# Patient Record
Sex: FEMALE | Race: WHITE | Employment: FULL TIME | URBAN - METROPOLITAN AREA
[De-identification: names, ages, dates, MRNs, and addresses within clinical notes are randomized per-mention and may not be internally consistent; named-entity substitution may affect disease eponyms.]

---

## 2017-05-11 ENCOUNTER — OFFICE VISIT (OUTPATIENT)
Dept: FAMILY MEDICINE CLINIC | Age: 52
End: 2017-05-11

## 2017-05-11 VITALS
HEIGHT: 66 IN | WEIGHT: 212 LBS | BODY MASS INDEX: 34.07 KG/M2 | TEMPERATURE: 97.9 F | RESPIRATION RATE: 16 BRPM | SYSTOLIC BLOOD PRESSURE: 128 MMHG | OXYGEN SATURATION: 98 % | HEART RATE: 84 BPM | DIASTOLIC BLOOD PRESSURE: 82 MMHG

## 2017-05-11 DIAGNOSIS — I10 ESSENTIAL HYPERTENSION, BENIGN: ICD-10-CM

## 2017-05-11 DIAGNOSIS — M79.89 SWELLING OF UPPER ARM: Primary | ICD-10-CM

## 2017-05-11 DIAGNOSIS — R03.0 ELEVATED BP WITHOUT DIAGNOSIS OF HYPERTENSION: ICD-10-CM

## 2017-05-11 DIAGNOSIS — K50.90 CROHN'S DISEASE WITHOUT COMPLICATION, UNSPECIFIED GASTROINTESTINAL TRACT LOCATION (HCC): ICD-10-CM

## 2017-05-11 RX ORDER — AMLODIPINE BESYLATE 5 MG/1
5 TABLET ORAL DAILY
Qty: 90 TAB | Refills: 0 | Status: SHIPPED | OUTPATIENT
Start: 2017-05-11 | End: 2017-10-09 | Stop reason: SDUPTHER

## 2017-05-11 RX ORDER — AMLODIPINE BESYLATE 5 MG/1
5 TABLET ORAL DAILY
Qty: 90 TAB | Refills: 1 | Status: CANCELLED | OUTPATIENT
Start: 2017-05-11

## 2017-05-11 RX ORDER — CEPHALEXIN 500 MG/1
500 CAPSULE ORAL 2 TIMES DAILY
Qty: 14 CAP | Refills: 0 | Status: SHIPPED | OUTPATIENT
Start: 2017-05-11 | End: 2017-05-18

## 2017-05-11 RX ORDER — TRIAMCINOLONE ACETONIDE 5 MG/G
CREAM TOPICAL 2 TIMES DAILY
Qty: 15 G | Refills: 0 | Status: SHIPPED | OUTPATIENT
Start: 2017-05-11

## 2017-05-11 NOTE — MR AVS SNAPSHOT
Visit Information Date & Time Provider Department Dept. Phone Encounter #  
 5/11/2017  1:00 PM Minor Landis, [address] [id number] Follow-up Instructions Return in about 3 weeks (around 6/1/2017), or w/ dr. Pierre Rehman. Upcoming Health Maintenance Date Due  
 PAP AKA CERVICAL CYTOLOGY 6/3/2017 INFLUENZA AGE 9 TO ADULT 8/1/2017 DTaP/Tdap/Td series (2 - Td) 6/3/2024 Allergies as of 5/11/2017  Review Complete On: 5/11/2017 By: Minor Landis MD  
  
 Severity Noted Reaction Type Reactions Lisinopril  04/15/2015    Cough Current Immunizations  Never Reviewed Name Date Influenza Vaccine (Quad) PF 9/30/2016, 10/28/2015 Tdap 6/3/2014 Not reviewed this visit You Were Diagnosed With   
  
 Codes Comments Swelling of upper arm    -  Primary ICD-10-CM: M79.89 ICD-9-CM: 729.81 Crohn's disease without complication, unspecified gastrointestinal tract location [facility])     ICD-10-CM: K50.90 ICD-9-CM: 555.9 Essential hypertension, benign     ICD-10-CM: I10 
ICD-9-CM: 401.1 Vitals BP Pulse Temp Resp Height(growth percentile) Weight(growth percentile) 128/82 (BP 1 Location: Left arm, BP Patient Position: Sitting) 84 97.9 °F (36.6 °C) (Oral) 16 5' 6\" (1.676 m) 212 lb (96.2 kg) SpO2 BMI OB Status Smoking Status 98% 34.22 kg/m2 Having regular periods Never Smoker Vitals History BMI and BSA Data Body Mass Index Body Surface Area  
 34.22 kg/m 2 2.12 m 2 Preferred Pharmacy Pharmacy Name Phone [facility] [address] [place] [address] [phone] Your Updated Medication List  
  
   
This list is accurate as of: 5/11/17  1:37 PM.  Always use your most recent med list. amLODIPine 5 mg tablet Commonly known as:  Janine Pretty Take 1 Tab by mouth daily. cephALEXin 500 mg capsule Commonly known as:  Yuliana Torres Take 1 Cap by mouth two (2) times a day for 7 days. cholecalciferol 1,000 unit Cap Commonly known as:  VITAMIN D3 Take 2,000 Units by mouth daily. iron, carbonyl 45 mg Tab Commonly known as:  Diya Patron Take 1 Tab by mouth daily. multivitamin tablet Commonly known as:  ONE A DAY Take 1 Tab by mouth daily. Nature Made Multi for Her 50+ PROBIOTIC PEARLS PO Take 1 Cap by mouth daily. REMICADE IV  
by IntraVENous route every six (6) weeks. triamcinolone 0.5 % topical cream  
Commonly known as:  ARISTOCORT Apply  to affected area two (2) times a day. use thin layer TYLENOL EXTRA STRENGTH 500 mg tablet Generic drug:  acetaminophen Take  by mouth every six (6) hours as needed for Pain. TYLENOL SINUS CONGESTION PAIN 2-5-325 mg Tab Generic drug:  cpm-phenyleph-acetaminophen Take  by mouth. Prescriptions Sent to Pharmacy Refills  
 amLODIPine (NORVASC) 5 mg tablet 0 Sig: Take 1 Tab by mouth daily. Class: Normal  
 Pharmacy: [address] Ph #: [phone] Route: Oral  
 cephALEXin (KEFLEX) 500 mg capsule 0 Sig: Take 1 Cap by mouth two (2) times a day for 7 days. Class: Normal  
 Pharmacy: [address] Ph #: [phone] Route: Oral  
 triamcinolone (ARISTOCORT) 0.5 % topical cream 0 Sig: Apply  to affected area two (2) times a day. use thin layer Class: Normal  
 Pharmacy: [address] Ph #: [phone] Route: Topical  
  
We Performed the Following [address] [facility] [id number] CPT(R)] Follow-up Instructions Return in about 3 weeks (around 6/1/2017), or w/ dr. Jadyn Mcmullen. Introducing [facility]! Dear Linda Coates: Thank you for requesting a MyChart account.   Our records indicate that you already have an active [employer] account. You can access your account anytime at [email or web address] Did you know that you can access your hospital and ER discharge instructions at any time in [employer]? You can also review all of your test results from your hospital stay or ER visit. Additional Information If you have questions, please visit the Frequently Asked Questions section of the [employer] website at [email or web address] Remember, [employer] is NOT to be used for urgent needs. For medical emergencies, dial 911. Now available from your iPhone and Android! Please provide this summary of care documentation to your next provider. Your primary care clinician is listed as Enedina Michelle. If you have any questions after today's visit, please call [phone].

## 2017-05-11 NOTE — PROGRESS NOTES
Chief Complaint   Patient presents with    Rash     on upper forearm ithcing     1. Have you been to the ER, urgent care clinic since your last visit? Hospitalized since your last visit? No    2. Have you seen or consulted any other health care providers outside of the [address] [place] [address] since your last visit? Include any pap smears or colon screening.  No

## 2017-05-11 NOTE — PROGRESS NOTES
Shruti Turner, 54 y.o.,  female    SUBJECTIVE  R upper arm rash/swelling x 3 days (Dr. Ced Ricardo pt)    Pt reports R medial arm deep soreness, bruising, itching, tenderness. Says occurred few days after remicaide infusion on R brachial area for her crohns. No trauma, new topical allergens she is aware of, discomfort is tolerable. No previous h/o similar problem with previous infusion. Also requesting norvasc refill for HTN    ROS:  See HPI, all others negative        Patient Active Problem List   Diagnosis Code    Atrial fibrillation ([facility]) I48.91    PSVT (paroxysmal supraventricular tachycardia) ([facility]) I47.1    Chest pain, unspecified R07.9    Crohn's disease ([facility] [address] K50.90    Essential hypertension, benign I10    S/P ablation operation for arrhythmia Z98.890, Z86.79    Anal fistula K60.3    Rectovaginal fistula N82.3    Hypertension I10    S/P colonoscopy/ repeat in 5 years around 2020. Z98.890    Prediabetes R73.03    Sleep apnea/ on c-pap following neurology  G47.30    Vitamin D deficiency E55.9       Current Outpatient Prescriptions   Medication Sig Dispense Refill    amLODIPine (NORVASC) 5 mg tablet Take 1 Tab by mouth daily. 90 Tab 0    cephALEXin (KEFLEX) 500 mg capsule Take 1 Cap by mouth two (2) times a day for 7 days. 14 Cap 0    triamcinolone (ARISTOCORT) 0.5 % topical cream Apply  to affected area two (2) times a day. use thin layer 15 g 0    multivitamin (ONE A DAY) tablet Take 1 Tab by mouth daily. Nature Made Multi for Her 50+      L. ACIDOPHILUS/BIFIDO LONGUM (PROBIOTIC PEARLS PO) Take 1 Cap by mouth daily.  cholecalciferol (VITAMIN D3) 1,000 unit cap Take 2,000 Units by mouth daily.  iron, carbonyl (FEOSOL) 45 mg tab Take 1 Tab by mouth daily.  acetaminophen (TYLENOL EXTRA STRENGTH) 500 mg tablet Take  by mouth every six (6) hours as needed for Pain.  INFLIXIMAB (REMICADE IV) by IntraVENous route every six (6) weeks.       cpm-phenyleph-acetaminophen (TYLENOL SINUS CONGESTION PAIN) 2-5-325 mg tab Take  by mouth. Allergies   Allergen Reactions    Lisinopril Cough       Past Medical History:   Diagnosis Date    Anal fistula     Anemia     Crohn's disease ([address]     H/O prior ablation treatment     H/O tubal ligation     History of cholecystectomy     History of echocardiogram 12/19/2011    EF 60-65%. No significant valvular heart disease.  Hypertension     Normal nuclear stress test 12/19/2011    No ischemia or prior infarction. EF 62%. Equivocal EKG on max EST. Ex time 6:31.  PSVT (paroxysmal supraventricular tachycardia) ([facility])        Social History     Social History    Marital status:      Spouse name: N/A    Number of children: N/A    Years of education: N/A     Occupational History    Not on file. Social History Main Topics    Smoking status: Never Smoker    Smokeless tobacco: Never Used    Alcohol use Yes      Comment: rarely    Drug use: No    Sexual activity: Not Currently     Other Topics Concern    Not on file     Social History Narrative       Family History   Problem Relation Age of Onset    Breast Cancer Mother     Cancer Mother      breast    Hypertension Father     Pacemaker Father 80    Depression Daughter     Alcohol abuse Sister     Drug Abuse Sister          OBJECTIVE    Physical Exam:     Visit Vitals    /82 (BP 1 Location: Left arm, BP Patient Position: Sitting)    Pulse 84    Temp 97.9 °F (36.6 °C) (Oral)    Resp 16    Ht 5' 6\" (1.676 m)    Wt 212 lb (96.2 kg)    SpO2 98%    BMI 34.22 kg/m2       General: alert, well-appearing, in no apparent distress or pain  CVS: normal rate, regular rhythm, distinct S1 and S2  Lungs:clear to ausculation bilaterally, no crackles, wheezing or rhonchi noted  Abdomen: normoactive bowel sounds, soft, non-tender  Extremities: mild bruising, tender knot on R upper medial arm, mild scaling and warmth as well.    Skin: warm, no lesions, rashes noted  Psych: mood and affect normal        ASSESSMENT/PLAN  Anastacia Diego was seen today for rash. Diagnoses and all orders for this visit:    Swelling of upper arm  Consider superficial thrombophlebitis, pt cannot be on NSAIDs/ASA due to crohns  Check:  -     DUPLEX EXTREM VENOUS,UNI OR LTD  Will give topical steroids and keflex to cover for dermatitis/cellulitis as well. -     cephALEXin (KEFLEX) 500 mg capsule; Take 1 Cap by mouth two (2) times a day for 7 days. -     triamcinolone (ARISTOCORT) 0.5 % topical cream; Apply  to affected area two (2) times a day. use thin layer  Crohn's disease without complication, unspecified gastrointestinal tract location [facility])    Essential hypertension, benign  -     amLODIPine (NORVASC) 5 mg tablet; Take 1 Tab by mouth daily. Ff-up in with dr. Richard Marques in 3 weeks. Or sooner prn. Patient understands plan of care. Patient has provided input and agrees with goals.

## 2017-05-25 ENCOUNTER — HOSPITAL ENCOUNTER (OUTPATIENT)
Dept: VASCULAR SURGERY | Age: 52
Discharge: HOME | End: 2017-05-25
Attending: FAMILY MEDICINE
Payer: COMMERCIAL

## 2017-05-25 DIAGNOSIS — M79.89 SWELLING OF UPPER ARM: ICD-10-CM

## 2017-05-25 PROCEDURE — 93971 EXTREMITY STUDY: CPT

## 2017-05-25 NOTE — PROCEDURES
[facility]  *** FINAL REPORT ***    Name: Manny Holguin  MRN: [id number]    Outpatient  : 01 Sep 1965  HIS Order #: [id number]  [address] [facility] Visit #: [id number]  Date: 25 May 2017    TYPE OF TEST: Peripheral Venous Testing    REASON FOR TEST  Limb swelling    Right Arm:-  Deep venous thrombosis:           No  Superficial venous thrombosis:    No      INTERPRETATION/FINDINGS  Duplex images were obtained using 2-D gray scale, color flow, and  spectral Doppler analysis. Right arm :  1. Deep vein(s) visualized include the internal jugular, subclavian,  axillary and brachial veins. 2. No evidence of deep venous thrombosis detected in the veins  visualized. 3. No evidence of deep vein thrombosis in the contralateral internal  jugular and subclavian veins. 4. No evidence of deep vein thrombosis in the contralateral internal  jugular vein. 5. No evidence of deep vein thrombosis in the contralateral subclavian   vein. 6. Superficial vein(s) visualized include the basilic (upper arm),  basilic (forearm), cephalic (upper arm) and cephalic (forearm) veins. 7. No evidence of superficial thrombosis detected. ADDITIONAL COMMENTS    I have personally reviewed the data relevant to the interpretation of  this  study. TECHNOLOGIST: Sajan Umaña, [facility], RVT/  Signed: 2017 11:06 AM    PHYSICIAN: Holland Tom D.O.   Signed: 2017 03:35 PM

## 2017-05-30 NOTE — PROGRESS NOTES
No evidence of clot on US, this is reassuring, how is the arm swelling? If not improved pls have her ff-up with dr. Kelli Meza.

## 2017-06-01 NOTE — PROGRESS NOTES
Patient identified with 2 identifiers (name and ). Patient aware of US results showing no clot. Patient states arm is much better.

## 2017-10-10 RX ORDER — AMLODIPINE BESYLATE 5 MG/1
TABLET ORAL
Qty: 30 TAB | Refills: 0 | Status: SHIPPED | OUTPATIENT
Start: 2017-10-10

## 2017-10-10 RX ORDER — AMLODIPINE BESYLATE 5 MG/1
TABLET ORAL
Qty: 90 TAB | Refills: 0 | Status: SHIPPED | OUTPATIENT
Start: 2017-10-10 | End: 2017-10-10 | Stop reason: SDUPTHER

## 2017-11-10 ENCOUNTER — HOSPITAL ENCOUNTER (OUTPATIENT)
Dept: GENERAL RADIOLOGY | Age: 52
Discharge: HOME | End: 2017-11-10
Attending: INTERNAL MEDICINE
Payer: COMMERCIAL

## 2017-11-10 DIAGNOSIS — R19.5 CHANGE IN STOOL CALIBER: ICD-10-CM

## 2017-11-10 PROCEDURE — 74270 X-RAY XM COLON 1CNTRST STD: CPT

## 2017-11-10 PROCEDURE — 74011000255 HC RX REV CODE- 255: Performed by: INTERNAL MEDICINE

## 2017-11-10 RX ADMIN — BARIUM SULFATE: 0.6 CREAM ORAL at 08:00

## 2017-11-10 RX ADMIN — BARIUM SULFATE 176 G: 960 POWDER, FOR SUSPENSION ORAL at 08:00

## 2017-11-16 ENCOUNTER — OFFICE VISIT (OUTPATIENT)
Dept: SURGERY | Age: 52
End: 2017-11-16

## 2017-11-16 VITALS
BODY MASS INDEX: 34.87 KG/M2 | HEIGHT: 66 IN | WEIGHT: 217 LBS | DIASTOLIC BLOOD PRESSURE: 84 MMHG | SYSTOLIC BLOOD PRESSURE: 128 MMHG | RESPIRATION RATE: 16 BRPM | TEMPERATURE: 98.1 F | OXYGEN SATURATION: 95 % | HEART RATE: 76 BPM

## 2017-11-16 DIAGNOSIS — N81.6 RECTOCELE: Primary | ICD-10-CM

## 2017-11-16 NOTE — LETTER
11/16/2017 11:44 AM 
 
Patient:  Marci Dickerson YOB: 1965 Date of Visit: 11/16/2017 Raleigh Severance, MD 
[address] [place] [address] [address] [facility] [address] VIA Facsimile: [phone] Manohar Hu MD 
[address] [facility] [address] [facility] [place] [address] VIA In Basket Dear Sadi Coco saw Miguel Al in the office today for her issues with constipation. On defecography she has a very large anterior rectocele. She has an underlying history of Crohn's disease. She tells me she had pancolonic disease prior to the initiation of Remicade as well as anorectal disease with fistula and a fissure. On exam she has a relatively stenotic anal canal with a palpable moderate size anterior rectocele. Given her anorectal issues and history of Crohn's I think the preferred approach for repair would be transvaginally. I tell her I perform this procedure transrectally with excellent results but that she would be far better off avoiding this approach given her disease. We will refer her to Dr. Augusto Gr for consultation regarding repair. Thank you very much for your referral of Ms. Reno Fernando. If you have questions, please do not hesitate to call me. I look forward to following Ms. Payne along with you and will keep you updated as to her progress. Sincerely, Henrry Cho MD

## 2017-11-16 NOTE — PROGRESS NOTES
HPI: David Lee is a 50 y.o. female presenting with chief complain of rectocele. The patient has had long-standing issues with constipation. This is gotten worse over the last year. She has tried Metamucil. She cannot tolerate MiraLAX. She says she has bowel movements approximately every 4-5 days unless she has enemas for help. She sees occasional blood on the toilet paper when wiping. She has some occasional bloating. She has a history of Crohn's disease. She states this was pan colonic. There were also areas of the small bowel that were seen as inflamed on capsule study. She has been on Remicade for approximately 5 years with excellent effect. Recent flexible sigmoidoscopy to the splenic flexure did not show any active disease. She denies fecal soilage or incontinence. She had a colonoscopy 2 years ago which was normal.  She had recent defecography which showed a large rectocele, though it did eventually empty. She has a history of perianal abscess and fistula. She is status post seton placement. Currently the fistulas are quiescent on the Remicade. Past Medical History:   Diagnosis Date    Anal fistula     Anemia     Crohn's disease ([address]     H/O prior ablation treatment     H/O tubal ligation     History of cholecystectomy     History of echocardiogram 12/19/2011    EF 60-65%. No significant valvular heart disease.  Hypertension     Normal nuclear stress test 12/19/2011    No ischemia or prior infarction. EF 62%. Equivocal EKG on max EST. Ex time 6:31.  PSVT (paroxysmal supraventricular tachycardia) ([facility])        Past Surgical History:   Procedure Laterality Date    CARDIAC SURG PROCEDURE UNLIST      cardiac ablation for svt    HX ADENOIDECTOMY      HX CHOLECYSTECTOMY      HX GI  5/5/2011    Incision & Drainage of perirectal abscess w/ placement of seton    HX GI  2/17/2011    Incision & Drainage of perirectal abscess w/ placement of seton.     HX TUBAL LIGATION Family History   Problem Relation Age of Onset    Breast Cancer Mother     Cancer Mother      breast    Hypertension Father     Pacemaker Father 80    Depression Daughter     Alcohol abuse Sister     Drug Abuse Sister        Social History     Social History    Marital status:      Spouse name: N/A    Number of children: N/A    Years of education: N/A     Social History Main Topics    Smoking status: Never Smoker    Smokeless tobacco: Never Used    Alcohol use Yes      Comment: rarely    Drug use: No    Sexual activity: Not Currently     Other Topics Concern    None     Social History Narrative       Review of Systems - Review of Systems   Constitutional: Negative. HENT: Negative. Eyes: Negative. Respiratory: Negative. Cardiovascular: Negative. Gastrointestinal: Positive for blood in stool and constipation. Negative for abdominal pain, diarrhea, heartburn, melena, nausea and vomiting. Genitourinary: Negative. Musculoskeletal: Negative. Skin: Negative. Neurological: Negative. Endo/Heme/Allergies: Negative. Psychiatric/Behavioral: Negative for depression, hallucinations, memory loss, substance abuse and suicidal ideas. The patient is nervous/anxious. The patient does not have insomnia. Outpatient Prescriptions Marked as Taking for the 11/16/17 encounter (Office Visit) with Darya Franklin MD   Medication Sig Dispense Refill    amLODIPine (NORVASC) 5 mg tablet TAKE 1 TABLET BY MOUTH DAILY. 30 Tab 0    multivitamin (ONE A DAY) tablet Take 1 Tab by mouth daily. Nature Made Multi for Her 50+      L. ACIDOPHILUS/BIFIDO LONGUM (PROBIOTIC PEARLS PO) Take 1 Cap by mouth daily.  cholecalciferol (VITAMIN D3) 1,000 unit cap Take 2,000 Units by mouth daily.  iron, carbonyl (FEOSOL) 45 mg tab Take 1 Tab by mouth daily.  cpm-phenyleph-acetaminophen (TYLENOL SINUS CONGESTION PAIN) 2-5-325 mg tab Take  by mouth.       acetaminophen (TYLENOL EXTRA STRENGTH) 500 mg tablet Take  by mouth every six (6) hours as needed for Pain.  INFLIXIMAB (REMICADE IV) by IntraVENous route every six (6) weeks. Allergies   Allergen Reactions    Lisinopril Cough       Vitals:    11/16/17 1028   BP: 128/84   Pulse: 76   Resp: 16   Temp: 98.1 °F (36.7 °C)   TempSrc: Oral   SpO2: 95%   Weight: 98.4 kg (217 lb)   Height: 5' 6\" (1.676 m)   PainSc:   0 - No pain       Physical Exam   Constitutional: She appears well-developed and well-nourished. HENT:   Head: Normocephalic and atraumatic. Eyes: Conjunctivae and EOM are normal.   Abdominal: Soft. She exhibits no distension. There is no tenderness. Musculoskeletal: Normal range of motion. Lymphadenopathy:     She has no cervical adenopathy. Right: No inguinal adenopathy present. Left: No inguinal adenopathy present. Neurological: She exhibits normal muscle tone. Skin: Skin is warm and dry. No rash noted. Psychiatric: She has a normal mood and affect. Her speech is normal.   Rectum: Fistulous opening in the left lateral area, no evidence of granulation tissue, uncertain if this is healed or not  Digital rectal exam reveals a slightly strictured anal canal, no mass, palpable anterior fissure  She has a moderate sized anterior rectocele    Defecography personally reviewed by me; large rectocele;     Assessment / Plan    Large anterior rectocele in the setting of Crohn's disease, both perianal and colonic  I think she would be far better off with consultation for rectocele repair transvaginally versus transrectally given her underlying Crohn's  We will refer her to Dr. Serafin Jones    The diagnoses and plan were discussed with the patient. All questions answered. Plan of care agreed to by all concerned.

## 2020-01-28 ENCOUNTER — OFFICE VISIT (OUTPATIENT)
Dept: NEUROLOGY | Age: 55
End: 2020-01-28

## 2020-01-28 DIAGNOSIS — G31.84 MILD COGNITIVE IMPAIRMENT: Primary | ICD-10-CM

## 2020-01-28 DIAGNOSIS — R41.3 SHORT-TERM MEMORY LOSS: ICD-10-CM

## 2020-01-28 DIAGNOSIS — K50.00 CROHN'S DISEASE OF SMALL INTESTINE WITHOUT COMPLICATION (HCC): ICD-10-CM

## 2020-01-28 DIAGNOSIS — Z86.59 HISTORY OF ANXIETY: ICD-10-CM

## 2020-01-28 DIAGNOSIS — R47.89 WORD FINDING DIFFICULTY: ICD-10-CM

## 2020-01-28 DIAGNOSIS — Z81.8 FAMILY HISTORY OF DEMENTIA: ICD-10-CM

## 2020-01-28 DIAGNOSIS — I47.1 PSVT (PAROXYSMAL SUPRAVENTRICULAR TACHYCARDIA) (HCC): ICD-10-CM

## 2020-01-28 DIAGNOSIS — R45.3 APATHY: ICD-10-CM

## 2020-01-28 NOTE — PROGRESS NOTES
[address] [facility],[address]  [address]   [place] [address] [place] [facility]   [phone] Office   [phone] Fax      Neuropsychology    Initial Diagnostic Interview Note      Referral:  Lissette Royal MD    Zeke Zamora is a 48 y.o. right handed   female who was unaccompanied to the initial clinical interview on 1/28/20. Please refer to her medical records for details pertaining to her history. Briefly, the patient reported  That she completed college without history of previously diagnosed LD and/or receipt of special education services. She is not working and not on disability. The patient has been having problems with cognitive issues, and was referred to neuro a number of years ago because of this. She had been spending a lot of money and wasn't aware of it and her family was concerned and she then is concerned about possible frontotemporal dementia  She has been having problems with speaking for awhile. She did come in five years ago because she was having trouble with nouns, and worried about aphasia. Now, she can't come up with the right word and will have to describe it. That issue has progressed. Most notably, she has forgotten her son's middle name once and had to look it up on his birth certificate. Recently forgot her kid's birthday, and this happens frequently. She has no known background stroke, meningitis/encephalitis, ACOSTA Fever, Lupus, Lyme, TBI, sz, etc.  Her children say that she is asking the same questions over and over. This has started a couple of years ago and has been progressing. MRI from 2016:    Procedure: MRI of the brain without contrast     CPT code: 18779     Comparisons: None.     Indications:  Dementia, altered mental status         Technique: Brain scanned with sagittal T1W scans, axial T2W scans, axial  diffusion weighted images.      Findings:      1. Diffusion weighted images are without evidence for acute ischemia. No  evidence for old lacunar or other infarct. 2.  No abnormal area of increased signal noted within the brain parenchyma on  long TE images. 3.  There is no evidence for mass, mass effect, or midline shift. 4.  No intra- or extra-axial fluid collections are identified. 5.  The ventricles, sulci, and cisterns are unremarkable for age. 6.  The midline structures of the brain to include the pituitary gland, corpus  callosum, and brainstem structures are normal appearing. 7.  Normal major vessel flow voids are present. 8.  Within the limitations of no intravenous gadolinium administration, the  dural sinuses and leptomeninges are unremarkable. 9.  The extra-axial craniofacial structures incidentally imaged are normal.        Impression:     Unremarkable MRI of the brain without contrast.    I also reviewed records scanned into the chart. Referred to neurology for memory issues back in     Her father had dementia towards the end of his life. He lived till 80. Mother  in her 62s and she was aggressive and nasty and ugly a lot and was an alcoholic and not sure if she had dementia or not. She has a history of anxiety as well. She talked to a psychologist about it awhile ago. Was having trouble completing tasks, and it was difficult for her after her spouse . She was not on medication. She also has some apathy. Can't complete tasks. Can't seem to get herself together to get her son's braces tightened for the past 8 months. Not prone to do things. Used to live in [place] for a bit and came back here. She has not noticed any changes in sense of taste or smell. Balance is fine. No acute or focal issues. Enjoys walking. Gets into states of \"enthusiasm\" - where she will exercise and such for a few months and then stops. Ability to read has declined.   Reads romance novels, but avoids literature because she is missing details. She used to be able to read medical journals. 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 There is a family history of ADHD type concerns (Daughter with ADHD and anxiety, other daughter with depression/anxiety), son with Asperger's. Spouse may have had Asperger's also, and passed away 13 years ago. She  Does have sleep apnea but doesn't tolerate the CPAP. She was making minor purchases, not like buying major items. Her kids now monitor her finances. No previous neuropsych. Neuropsychological Mental Status Exam (NMSE):  Historian: Good  Praxis: No UE apraxia  R/L Orientation: Intact to self and to other  Dress: within normal limits   Weight: Overweight   Appearance/Hygiene: within normal limits   Gait: within normal limits   Assistive Devices: Glasses  Mood: within normal limits   Affect: within normal limits   Comprehension: within normal limits   Thought Process: within normal limits   Expressive Language: within normal limits   Receptive Language: within normal limits   Motor:  No cognitive or motor perseveration  ETOH: rarely  Tobacco: Denied  Illicit: Denied  SI/HI: Denied  Psychosis: Denied  Insight: Within normal limits  Judgment: Within normal limits  Other Psych:      Past Medical History:   Diagnosis Date    Anal fistula     Anemia     Crohn's disease ([facility] [address]     H/O prior ablation treatment     H/O tubal ligation     History of cholecystectomy     History of echocardiogram 12/19/2011    EF 60-65%. No significant valvular heart disease.  Hypertension     Normal nuclear stress test 12/19/2011    No ischemia or prior infarction. EF 62%. Equivocal EKG on max EST. Ex time 6:31.     PSVT (paroxysmal supraventricular tachycardia) ([facility] [address] [facility]        Past Surgical History:   Procedure Laterality Date    CARDIAC SURG PROCEDURE UNLIST      cardiac ablation for svt    HX ADENOIDECTOMY      HX CHOLECYSTECTOMY      HX GI  5/5/2011 Incision & Drainage of perirectal abscess w/ placement of seton    HX GI  2/17/2011    Incision & Drainage of perirectal abscess w/ placement of seton.  HX TUBAL LIGATION         Allergies   Allergen Reactions    Lisinopril Cough       Family History   Problem Relation Age of Onset    Breast Cancer Mother     Cancer Mother         breast    Hypertension Father     Pacemaker Father 80    Depression Daughter     Alcohol abuse Sister     Drug Abuse Sister        Social History     Tobacco Use    Smoking status: Never Smoker    Smokeless tobacco: Never Used   Substance Use Topics    Alcohol use: Yes     Comment: rarely    Drug use: No       Current Outpatient Medications   Medication Sig Dispense Refill    amLODIPine (NORVASC) 5 mg tablet TAKE 1 TABLET BY MOUTH DAILY. 30 Tab 0    triamcinolone (ARISTOCORT) 0.5 % topical cream Apply  to affected area two (2) times a day. use thin layer 15 g 0    multivitamin (ONE A DAY) tablet Take 1 Tab by mouth daily. Nature Made Multi for Her 50+      L. ACIDOPHILUS/BIFIDO LONGUM (PROBIOTIC PEARLS PO) Take 1 Cap by mouth daily.  cholecalciferol (VITAMIN D3) 1,000 unit cap Take 2,000 Units by mouth daily.  iron, carbonyl (FEOSOL) 45 mg tab Take 1 Tab by mouth daily.  cpm-phenyleph-acetaminophen (TYLENOL SINUS CONGESTION PAIN) 2-5-325 mg tab Take  by mouth.  acetaminophen (TYLENOL EXTRA STRENGTH) 500 mg tablet Take  by mouth every six (6) hours as needed for Pain.  INFLIXIMAB (REMICADE IV) by IntraVENous route every six (6) weeks. Plan:  Obtain authorization for testing from insurance company. Report to follow once testing, scoring, and interpretation completed. ? Organic based neurocognitive issues versus mood disorder or combination of same. ? Problems organic, functional, or both? This note will not be viewable in [address]

## 2020-01-30 ENCOUNTER — OFFICE VISIT (OUTPATIENT)
Dept: NEUROLOGY | Age: 55
End: 2020-01-30

## 2020-01-30 DIAGNOSIS — F43.23 ADJUSTMENT DISORDER WITH MIXED ANXIETY AND DEPRESSED MOOD: ICD-10-CM

## 2020-01-30 DIAGNOSIS — Z86.59 HISTORY OF ANXIETY: ICD-10-CM

## 2020-01-30 DIAGNOSIS — G31.84 MILD COGNITIVE IMPAIRMENT: Primary | ICD-10-CM

## 2020-01-30 DIAGNOSIS — R45.3 APATHY: ICD-10-CM

## 2020-01-30 DIAGNOSIS — Z81.8 FAMILY HISTORY OF DEMENTIA: ICD-10-CM

## 2020-01-30 NOTE — LETTER
2/3/20 Patient: Theresa Frances YOB: 1965 Date of Visit: 1/30/2020 Jordan Pratt, [address] [facility] [address] [facility] [address] [address] VIA Facsimile: [phone] Dear KARELY Crump, Thank you for referring Ms. Anju Clark to [facility] for evaluation. My notes for this consultation are attached. If you have questions, please do not hesitate to call me. I look forward to following your patient along with you. Sincerely, Mil Francois PsyD

## 2020-02-03 NOTE — PROGRESS NOTES
[address] [facility],5Th [address]  [address]   [place] [address] [place] [place] [address] [address]   [phone] Office   [phone] Fax      Neuropsychological Evaluation Report    Referral:  Juliane Hilary Chiuandrea    Chelsea Loomis is a 48 y.o. right handed   female who was unaccompanied to the initial clinical interview on 1/28/20. Please refer to her medical records for details pertaining to her history. Briefly, the patient reported  That she completed college without history of previously diagnosed LD and/or receipt of special education services. She is not working and not on disability. The patient has been having problems with cognitive issues, and was referred to neuro a number of years ago because of this. She had been spending a lot of money and wasn't aware of it and her family was concerned and she then is concerned about possible frontotemporal dementia  She has been having problems with speaking for awhile. She did come in five years ago because she was having trouble with nouns, and worried about aphasia. Now, she can't come up with the right word and will have to describe it. That issue has progressed. Most notably, she has forgotten her son's middle name once and had to look it up on his birth certificate. Recently forgot her kid's birthday, and this happens frequently. She has no known background stroke, meningitis/encephalitis, ACOSTA Fever, Lupus, Lyme, TBI, sz, etc.  Her children say that she is asking the same questions over and over. This has started a couple of years ago and has been progressing. MRI from 2016:    Procedure: MRI of the brain without contrast     CPT code: 59368     Comparisons: None.     Indications:  Dementia, altered mental status         Technique: Brain scanned with sagittal T1W scans, axial T2W scans, axial  diffusion weighted images.      Findings:      1.   Diffusion weighted images are without evidence for acute ischemia. No  evidence for old lacunar or other infarct. 2.  No abnormal area of increased signal noted within the brain parenchyma on  long TE images. 3.  There is no evidence for mass, mass effect, or midline shift. 4.  No intra- or extra-axial fluid collections are identified. 5.  The ventricles, sulci, and cisterns are unremarkable for age. 6.  The midline structures of the brain to include the pituitary gland, corpus  callosum, and brainstem structures are normal appearing. 7.  Normal major vessel flow voids are present. 8.  Within the limitations of no intravenous gadolinium administration, the  dural sinuses and leptomeninges are unremarkable. 9.  The extra-axial craniofacial structures incidentally imaged are normal.        Impression:     Unremarkable MRI of the brain without contrast.    I also reviewed records scanned into the chart. Referred to neurology for memory issues back in     Her father had dementia towards the end of his life. He lived till 80. Mother  in her 62s and she was aggressive and nasty and ugly a lot and was an alcoholic and not sure if she had dementia or not. She has a history of anxiety as well. She talked to a psychologist about it awhile ago. Was having trouble completing tasks, and it was difficult for her after her spouse . She was not on medication. She also has some apathy. Can't complete tasks. Can't seem to get herself together to get her son's braces tightened for the past 8 months. Not prone to do things. Used to live in [place] for a bit and came back here. She has not noticed any changes in sense of taste or smell. Balance is fine. No acute or focal issues. Enjoys walking. Gets into states of \"enthusiasm\" - where she will exercise and such for a few months and then stops. Ability to read has declined.   Reads romance novels, but avoids literature because she is missing details. She used to be able to read medical journals. 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 There is a family history of ADHD type concerns (Daughter with ADHD and anxiety, other daughter with depression/anxiety), son with Asperger's. Spouse may have had Asperger's also, and passed away 13 years ago. She  Does have sleep apnea but doesn't tolerate the CPAP. She was making minor purchases, not like buying major items. Her kids now monitor her finances. No previous neuropsych. Neuropsychological Mental Status Exam (NMSE):  Historian: Good  Praxis: No UE apraxia  R/L Orientation: Intact to self and to other  Dress: within normal limits   Weight: Overweight   Appearance/Hygiene: within normal limits   Gait: within normal limits   Assistive Devices: Glasses  Mood: within normal limits   Affect: within normal limits   Comprehension: within normal limits   Thought Process: within normal limits   Expressive Language: within normal limits   Receptive Language: within normal limits   Motor:  No cognitive or motor perseveration  ETOH: rarely  Tobacco: Denied  Illicit: Denied  SI/HI: Denied  Psychosis: Denied  Insight: Within normal limits  Judgment: Within normal limits  Other Psych:      Past Medical History:   Diagnosis Date    Anal fistula     Anemia     Crohn's disease ([facility] [address]     H/O prior ablation treatment     H/O tubal ligation     History of cholecystectomy     History of echocardiogram 12/19/2011    EF 60-65%. No significant valvular heart disease.  Hypertension     Normal nuclear stress test 12/19/2011    No ischemia or prior infarction. EF 62%. Equivocal EKG on max EST. Ex time 6:31.     PSVT (paroxysmal supraventricular tachycardia) (HCC)        Past Surgical History:   Procedure Laterality Date    CARDIAC SURG PROCEDURE UNLIST      cardiac ablation for svt    HX ADENOIDECTOMY      HX CHOLECYSTECTOMY      HX GI  5/5/2011    Incision & Drainage of perirectal abscess w/ placement of seton    HX GI  2/17/2011    Incision & Drainage of perirectal abscess w/ placement of seton.  HX TUBAL LIGATION         Allergies   Allergen Reactions    Lisinopril Cough       Family History   Problem Relation Age of Onset    Breast Cancer Mother     Cancer Mother         breast    Hypertension Father     Pacemaker Father 80    Depression Daughter     Alcohol abuse Sister     Drug Abuse Sister        Social History     Tobacco Use    Smoking status: Never Smoker    Smokeless tobacco: Never Used   Substance Use Topics    Alcohol use: Yes     Comment: rarely    Drug use: No       Current Outpatient Medications   Medication Sig Dispense Refill    amLODIPine (NORVASC) 5 mg tablet TAKE 1 TABLET BY MOUTH DAILY. 30 Tab 0    triamcinolone (ARISTOCORT) 0.5 % topical cream Apply  to affected area two (2) times a day. use thin layer 15 g 0    multivitamin (ONE A DAY) tablet Take 1 Tab by mouth daily. Nature Made Multi for Her 50+      L. ACIDOPHILUS/BIFIDO LONGUM (PROBIOTIC PEARLS PO) Take 1 Cap by mouth daily.  cholecalciferol (VITAMIN D3) 1,000 unit cap Take 2,000 Units by mouth daily.  iron, carbonyl (FEOSOL) 45 mg tab Take 1 Tab by mouth daily.  cpm-phenyleph-acetaminophen (TYLENOL SINUS CONGESTION PAIN) 2-5-325 mg tab Take  by mouth.  acetaminophen (TYLENOL EXTRA STRENGTH) 500 mg tablet Take  by mouth every six (6) hours as needed for Pain.  INFLIXIMAB (REMICADE IV) by IntraVENous route every six (6) weeks. Plan:  Obtain authorization for testing from insurance company. Report to follow once testing, scoring, and interpretation completed. ? Organic based neurocognitive issues versus mood disorder or combination of same. ? Problems organic, functional, or both? This note will not be viewable in [address]         Neuropsychological Evaluation  Patient Testing 1/30/20 Report Completed 2/3/20  A Psychometrist Assisted w/ portions of this evaluation while under my direct supervision    Please refer to the patient's initial interview progress note (copied above) and her medical records for details pertaining to her history. Today's neuropsychological test scores follow: The following assessment procedures were performed:      Neuropsychologist Performed, Interpreted, & Reported: Neuropsychological Mental Status Exam, Revised Memory & Behavior Checklist, Mini Mental State Exam, Clock Drawing Test, Test Of Premorbid Functioning, Danielle-Melzack Pain Questionnaire,  History Taking  & Clinical Interview With The Patient, , SHAY, CPT-III, Review Of Available Records. Psychometrist Administered & Neuropsychologist Interpreted & Neuropsychologist Reported: Finger Tapping Test, Grooved Pegboard Test, Speech-Sounds Perception Test, Eat"[employer]", Wechsler Adult Intelligence Scale - IV, Verbal Fluency Tests, Toni & Toni - Revised, Trailmaking Test Parts A & B, [place] Verbal Learning Test - 3, Leonard Complex Figure Test, Teran Depression Inventory - II, Teran Anxiety Inventory,. Test Findings:  Note:  The patients raw data have been compared with currently available norms which include demographic corrections for age, gender, and/or education. Sometimes, the patients scores are compared to demographically similar individuals as close to the patients age, education level, etc., as possible. \"Average\" is viewed as being +/- 1 standard deviation (SD) from the stated mean for a particular test score. \"Low average\" is viewed as being between 1 and 2 SD below the mean, and above average is viewed as being 1 and 2 SD above the mean. Scores falling in the borderline range (between 1-1/2 and 2 SD below the mean) are viewed with particular attention as to whether they are normal or abnormal neurocognitive test scores.   Other methods of inference in analyzing the test data are also utilized, including the pattern and range of scores in the profile, bilateral motor functions, and the presence, if any, of pathognomonic signs. Behaviorally, the patient was friendly and cooperative and appeared motivated to perform well during this examination. Within this context, the results of this evaluation are viewed as a valid reflection of the patients actual neurocognitive and emotional status. Her structured word list fluency, as assessed by the FAS Test, was within the average range with a T score of 45. Category fluency was within the average range with a T score of 49. Confrontation naming ability, as assessed by the [facility] Test - Revised, was within the above average range at 60/60 correct (T = 67). This pattern of performance is not indicative of a patient who is at increased risk for day-to-day problems with verbal fluency and confrontation naming ability was above average. .       The patient was administered the [facility] Continuous Performance Test - III, a computer-administered test of sustained attention, and review of the subscales within this instrument did not reveal clinically significant concerns for inattentiveness or impulsivity. Verbal auditory attention and discrimination, as assessed by the Speech-Sounds Perception Test (T = 53) was within the average range. Nonverbal auditory attention and discrimination, as assessed by the [facility] Rhythm Test (T = 60) was above average. This pattern of performance is not indicative of a patient who is at increased risk for day-to-day problems with sustained visual attention/concentration. Verbal and nonverbal auditory attention and discrimination related abilities were also normal.       The patient was administered the Wechsler Adult Intelligence Scale - IV. See Appendix I for full breakdown of IQ test scores (scanned into media section of this EMR).   As can be seen, there was no clinically significant difference between her average range Working Memory Index score of 95 (37th %ile) and her average range Processing Speed Index score of 94 (134th ile). Her Verbal Comprehension Index score of 122 (93rd %ile) was within the superior range. Her Perceptual Reasoning Index score of 109 (73rd %ile) was average. No FSIQ is reported due to domain scatter, as her scores vary from the average to superior range. Scores are commensurate with or higher than expected based on her performance on a task estimating premorbid functioning levels. The patient was administered the [place] Verbal Learning Test  - 3 and generated a normal range and positive learning curve over five repeated auditory word list learning trials. An interference trial was within normal limits. Free and cued, short and long delayed recall were within or above the normal range. Recognition and forced choice recall were within normal limits. This pattern of performance is not indicative of a patient who is at increased risk for day-to-day problems with auditory learning and/or memory. Visual organization and memory abilities were normal on the Leonard Complex Figure Test.  This is not indicative of a patient who is at increased risk for day-to-day problems with visual organization or visual memory. Simple timed visual motor sequencing (Trailmaking Test Part A) was within the mildly impaired range with a T score of 36. Her performance on a similar, but more complex task of timed visual motor sequencing (Trailmaking Test Part B) was within the average range with a T score of 49. She made zero sequencing errors on this latter test.  On additional assessment of executive functioning ([address] [place] [address] Test), the patient quickly and efficiently completed 6/6 categories on this test (>16th %ile). Taken together, this pattern of performance is not indicative of a patient who is at increased risk for day-to-day problems with executive functioning.        Motor speed for finger tapping was within the  Mildly to moderately impaired range bilaterally. The same pattern held true for her fine motor dexterity. This does not provide support for a focal or lateralized brain dysfunction, and neurologic correlation is indicated. The patient rated her current level of pain as \"0/5 - No Pain\" on the Danielle-Melzack Pain Questionnaire. Marina Perry Her Teran Depression Inventory -II score of 9 was within the minimally depressed range. Her Teran Anxiety Inventory score of 2 reflected minimal anxiety. The patient was also administered the Personality Assessment Inventory and generated a valid profile for interpretation, though some defensiveness in responding is noted. Despite this, there are concerns about physical functioning, some feelings of helplessness, and unhappiness. She is likely to be self-conscious in social interactions and others may view her as somewhat passive and unassuming. The personality profile is otherwise normal.      Impressions & Recommendations: This patient generated a predominantly normal range Neuropsychological Evaluation with respect to neurocognitive functioning. In this regard, the only impairments noted were for bilateral motor skills. Otherwise, her performance across all other neurocognitive domains assessed, including mental status, verbal fluency, confrontation naming, auditory learning and memory, visual organization, visual memory, visual attention, verbal auditory attention, nonverbal auditory attention ,working memory, processing speed, perceptual reasoning, verbal comprehension, and executive functioning remain normal.  IQ scores are within the average to superior range and there is no evidence of a decline from estimated premorbid functioning levels.   From an emotional standpoint, she was somewhat defensive in her response style on personality testing and reports some anxiety and apathy as well, as well as \"bouts of enthusiasm\" that do not appear to meet criteria for a hypomanic episode but her mood does fluctuate. Thankfully, there is no evidence which would suggest FTD or other organic based neurocognitive decline/disorder. Instead, mild mood issues and untreated sleep apnea are present, along with some day-to-day stressors and aging. She does have medical/vascular issues which can contribute, but thankfully no organic based neurocognitive change is seen here. This is very reassuring. I suggest consideration for mental health counseling on a prn basis, and consider psychiatric medication management for mood if counseling by itself does not mitigate the mood concerns. Stay active mentally, physically, and socially. Exam reassuring. Baseline now established. Follow up prn. Clinical correlation is, of course, inidcated. I will discuss these findings with the patient when she follows up with me in the near future. A follow up Neuropsychological Evaluation is indicated on a prn basis, especially if there are any cognitive and/or emotional changes. DIAGNOSES:   The Referral Diagnosis Of  Cognitive Difficulties - IS NOT SUPPORTED      Adjustment Disorder with Mixed Emotional Features      Psychosocial Stressors      History of Anxiety           The above information is based upon information currently available to me. If there is any additional information of which I am currently unaware, I would be more than happy to review it upon having it made available to me. Thank you for the opportunity to see this interesting individual.     Sincerely,       Odilon Olivas. Kristina Vasquez PsyD, EdS    CC: Mone Canas     Time Documentation:      47779 x1 &  68755 x 1 Neuropsych testing/data gathering by Neuropsychologist (60 minutes)     [phone] x 1  96139 x 7 Test Administration/Data Gathering By Technician: (4 hours).  80865 x 1 (first 30 minutes), 62385 x 7 (each additional 30 minutes)    96132 x 1  96133 x 1 Testing Evaluation Services by Neuropsychologist (1 hour, 50 minutes) 49662 x 1 (first hour), 96133 x 1 (50 minutes)    Definitions:      60018/36122:  Neurobehavioral Status Exam, Clinical interview. Clinical assessment of thinking, reasoning and judgment, by neuropsychologist, both face to face time with patient and time interpreting those test results and reporting, first and subsequent hours)    39110/06451: Neuropsychological Test Administration by Technician/Psychometrist, first 30 minutes and each additional 30 minutes. The above includes: Record review. Review of history provided by patient. Review of collaborative information. Testing by Clinician. Review of raw data. Scoring. Report writing of individual tests administered by Clinician. Integration of individual tests administered by psychometrist with NSE/testing by clinician, review of records/history/collaborative information, case Conceptualization, treatment planning, clinical decision making, report writing, coordination Of Care. Psychometry test codes as time spent by psychometrist administering and scoring neurocognitive/psychological tests under supervision of neuropsychologist.  Integral services including scoring of raw data, data interpretation, case conceptualization, report writing etcetera were initiated after the patient finished testing/raw data collected and was completed on the date the report was signed.

## 2020-05-01 ENCOUNTER — OFFICE VISIT (OUTPATIENT)
Dept: NEUROLOGY | Age: 55
End: 2020-05-01

## 2020-05-01 DIAGNOSIS — G31.84 MILD COGNITIVE IMPAIRMENT: Primary | ICD-10-CM

## 2020-05-01 DIAGNOSIS — F43.23 ADJUSTMENT DISORDER WITH MIXED ANXIETY AND DEPRESSED MOOD: ICD-10-CM

## 2020-05-01 DIAGNOSIS — Z86.59 HISTORY OF ANXIETY: ICD-10-CM

## 2020-05-01 NOTE — PROGRESS NOTES
This is a teleneuropsychology (audio/visual) visit that was performed with in the originating site at patient's home and the distance site at [facility] outpatient clinic at [facility]. Verbal consent to participate in the video visit was obtained. This visit occurred during the corona (COVID -19) public health emergency and these visits were authorized by the President of the [place]. I discussed with the patient the nature of our teleneuropsych visit in that :    - I would evaluate the patient and recommend diagnostics and treatment based on my assessment and impressions, and/or provided test results and discussed these issues with the patient and/or family.    - Our sessions are not being recorded and that personal health information is protected    - Our team will provide follow-up care in person if when the patient needs it. Prior to seeing the patient I reviewed the records, including the previously completed report, the records in [place], and any updated visits from other providers since I saw the patient last.      Today, I engaged in a psychoeducational and supportive psychotherapy and feedback session with the patient via teleneuropsychology. I reviewed the results of the recent Neuropsychological Evaluation, including discussing individual tests as well as patient's areas of neurocognitive strength versus weakness. We discussed, in detail, the following: This patient generated a predominantly normal range Neuropsychological Evaluation with respect to neurocognitive functioning. In this regard, the only impairments noted were for bilateral motor skills.    Otherwise, her performance across all other neurocognitive domains assessed, including mental status, verbal fluency, confrontation naming, auditory learning and memory, visual organization, visual memory, visual attention, verbal auditory attention, nonverbal auditory attention ,working memory, processing speed, perceptual reasoning, verbal comprehension, and executive functioning remain normal.  IQ scores are within the average to superior range and there is no evidence of a decline from estimated premorbid functioning levels. From an emotional standpoint, she was somewhat defensive in her response style on personality testing and reports some anxiety and apathy as well, as well as \"bouts of enthusiasm\" that do not appear to meet criteria for a hypomanic episode but her mood does fluctuate.                   Thankfully, there is no evidence which would suggest FTD or other organic based neurocognitive decline/disorder. Instead, mild mood issues and untreated sleep apnea are present, along with some day-to-day stressors and aging. She does have medical/vascular issues which can contribute, but thankfully no organic based neurocognitive change is seen here. This is very reassuring. I suggest consideration for mental health counseling on a prn basis, and consider psychiatric medication management for mood if counseling by itself does not mitigate the mood concerns. Stay active mentally, physically, and socially. Exam reassuring. Baseline now established. Follow up prn. Clinical correlation is, of course, inidcated.                   I will discuss these findings with the patient when she follows up with me in the near future. A follow up Neuropsychological Evaluation is indicated on a prn basis, especially if there are any cognitive and/or emotional changes.       DIAGNOSES:                         The Referral Diagnosis Of  Cognitive Difficulties - IS NOT SUPPORTED                                                  Adjustment Disorder with Mixed Emotional Features                                                  Psychosocial Stressors                                                  History of Anxiety        Education was provided regarding my diagnostic impressions, and we discussed treatment plan/options.    I also answered numerous questions related to the clinical findings, including discussing various methods to improve cognition and mood. Counseling provided regarding mood and cognition. CBT and supportive psychotherapy techniques were utilized. Supportive/Cognitive Behavioral/Solution Focused psychotherapy provided  Discussed rational versus irrational thinking patterns and their consequences. Discussed healthy/adaptive and unhealthy/maladaptive coping. The patient needs to follow with PCP and psychology as needed. Specific areas which were addressed include: medication, counseling. The patient had the following concerns which I deferred to their referring provider      Time spent today:  25    Pursuant to the emergency declaration under the [facility], [address] [place] [address] authority and the [employer] and [employer] Act, this Virtual  Visit (audio/visual) was conducted, with patient's consent, to reduce the patient's risk of exposure to COVID-19 and provide continuity of care. Services were provided in this manner to substitute for in-person clinic visit.

## 2021-12-09 ENCOUNTER — TRANSCRIBE ORDER (OUTPATIENT)
Dept: SCHEDULING | Age: 56
End: 2021-12-09

## 2021-12-09 DIAGNOSIS — E66.09 EXOGENOUS OBESITY: ICD-10-CM

## 2021-12-09 DIAGNOSIS — R73.09 IMPAIRED GLUCOSE TOLERANCE TEST: Primary | ICD-10-CM

## 2021-12-14 ENCOUNTER — TRANSCRIBE ORDER (OUTPATIENT)
Dept: SCHEDULING | Age: 56
End: 2021-12-14

## 2021-12-14 DIAGNOSIS — Z12.31 VISIT FOR SCREENING MAMMOGRAM: Primary | ICD-10-CM

## 2022-01-13 ENCOUNTER — TRANSCRIBE ORDER (OUTPATIENT)
Dept: SCHEDULING | Age: 57
End: 2022-01-13

## 2022-01-13 DIAGNOSIS — K50.113 CROHN'S COLITIS, WITH FISTULA (HCC): ICD-10-CM

## 2022-01-13 DIAGNOSIS — R10.9 ABDOMINAL PAIN IN FEMALE: ICD-10-CM

## 2022-01-13 DIAGNOSIS — I47.1 SUPRAVENTRICULAR TACHYCARDIA (HCC): ICD-10-CM

## 2022-01-13 DIAGNOSIS — R19.7 DIARRHEA, UNSPECIFIED TYPE: ICD-10-CM

## 2022-01-13 DIAGNOSIS — R63.4 WEIGHT LOSS, ABNORMAL: Primary | ICD-10-CM

## 2022-01-18 ENCOUNTER — HOSPITAL ENCOUNTER (OUTPATIENT)
Dept: CT IMAGING | Age: 57
Discharge: HOME | End: 2022-01-18
Payer: COMMERCIAL

## 2022-01-18 DIAGNOSIS — R10.9 ABDOMINAL PAIN IN FEMALE: ICD-10-CM

## 2022-01-18 DIAGNOSIS — R19.7 DIARRHEA, UNSPECIFIED TYPE: ICD-10-CM

## 2022-01-18 DIAGNOSIS — R63.4 WEIGHT LOSS, ABNORMAL: ICD-10-CM

## 2022-01-18 DIAGNOSIS — K50.113 CROHN'S COLITIS, WITH FISTULA (HCC): ICD-10-CM

## 2022-01-18 DIAGNOSIS — I47.1 SUPRAVENTRICULAR TACHYCARDIA (HCC): ICD-10-CM

## 2022-01-18 LAB — CREAT BLD-MCNC: 0.7 MG/DL (ref 0.6–1.3)

## 2022-01-18 PROCEDURE — 74177 CT ABD & PELVIS W/CONTRAST: CPT

## 2022-01-18 PROCEDURE — 74011000636 HC RX REV CODE- 636: Performed by: RADIOLOGY

## 2022-01-18 PROCEDURE — 82565 ASSAY OF CREATININE: CPT

## 2022-01-18 RX ADMIN — IOHEXOL 50 ML: 240 INJECTION, SOLUTION INTRATHECAL; INTRAVASCULAR; INTRAVENOUS; ORAL at 08:18

## 2022-01-18 RX ADMIN — IOPAMIDOL 100 ML: 755 INJECTION, SOLUTION INTRAVENOUS at 09:35

## 2022-02-21 ENCOUNTER — HOSPITAL ENCOUNTER (OUTPATIENT)
Dept: MAMMOGRAPHY | Age: 57
Discharge: HOME | End: 2022-02-21
Attending: PHYSICIAN ASSISTANT
Payer: COMMERCIAL

## 2022-02-21 DIAGNOSIS — Z12.31 VISIT FOR SCREENING MAMMOGRAM: ICD-10-CM

## 2022-02-21 PROCEDURE — 77063 BREAST TOMOSYNTHESIS BI: CPT

## 2022-09-22 ENCOUNTER — HOSPITAL ENCOUNTER (OUTPATIENT)
Age: 57
Setting detail: OBSERVATION
Discharge: HOME | End: 2022-09-24
Attending: EMERGENCY MEDICINE | Admitting: INTERNAL MEDICINE
Payer: COMMERCIAL

## 2022-09-22 ENCOUNTER — APPOINTMENT (OUTPATIENT)
Dept: CT IMAGING | Age: 57
End: 2022-09-22
Attending: STUDENT IN AN ORGANIZED HEALTH CARE EDUCATION/TRAINING PROGRAM
Payer: COMMERCIAL

## 2022-09-22 DIAGNOSIS — K50.111 CROHN'S DISEASE OF COLON WITH RECTAL BLEEDING (HCC): Primary | ICD-10-CM

## 2022-09-22 DIAGNOSIS — E87.6 HYPOKALEMIA: ICD-10-CM

## 2022-09-22 DIAGNOSIS — D64.9 ANEMIA, UNSPECIFIED TYPE: ICD-10-CM

## 2022-09-22 LAB
ALBUMIN SERPL-MCNC: 1.9 G/DL (ref 3.5–5)
ALBUMIN/GLOB SERPL: 0.4 {RATIO} (ref 1.1–2.2)
ALP SERPL-CCNC: 75 U/L (ref 45–117)
ALT SERPL-CCNC: 9 U/L (ref 12–78)
ANION GAP SERPL CALC-SCNC: 6 MMOL/L (ref 5–15)
AST SERPL-CCNC: 9 U/L (ref 15–37)
BASOPHILS # BLD: 0 K/UL (ref 0–0.1)
BASOPHILS NFR BLD: 0 % (ref 0–1)
BILIRUB SERPL-MCNC: 0.2 MG/DL (ref 0.2–1)
BUN SERPL-MCNC: 7 MG/DL (ref 6–20)
BUN/CREAT SERPL: 10 (ref 12–20)
CALCIUM SERPL-MCNC: 8.4 MG/DL (ref 8.5–10.1)
CHLORIDE SERPL-SCNC: 101 MMOL/L (ref 97–108)
CO2 SERPL-SCNC: 29 MMOL/L (ref 21–32)
CREAT SERPL-MCNC: 0.67 MG/DL (ref 0.55–1.02)
DIFFERENTIAL METHOD BLD: ABNORMAL
EOSINOPHIL # BLD: 0.3 K/UL (ref 0–0.4)
EOSINOPHIL NFR BLD: 5 % (ref 0–7)
ERYTHROCYTE [DISTWIDTH] IN BLOOD BY AUTOMATED COUNT: 15.9 % (ref 11.5–14.5)
GLOBULIN SER CALC-MCNC: 4.9 G/DL (ref 2–4)
GLUCOSE SERPL-MCNC: 133 MG/DL (ref 65–100)
HCT VFR BLD AUTO: 25.6 % (ref 35–47)
HGB BLD-MCNC: 7.5 G/DL (ref 11.5–16)
HISTORY CHECKED?,CKHIST: NORMAL
IMM GRANULOCYTES # BLD AUTO: 0 K/UL (ref 0–0.04)
IMM GRANULOCYTES NFR BLD AUTO: 0 % (ref 0–0.5)
LYMPHOCYTES # BLD: 0.8 K/UL (ref 0.8–3.5)
LYMPHOCYTES NFR BLD: 15 % (ref 12–49)
MCH RBC QN AUTO: 21.1 PG (ref 26–34)
MCHC RBC AUTO-ENTMCNC: 29.3 G/DL (ref 30–36.5)
MCV RBC AUTO: 72.1 FL (ref 80–99)
MONOCYTES # BLD: 0.5 K/UL (ref 0–1)
MONOCYTES NFR BLD: 10 % (ref 5–13)
NEUTS SEG # BLD: 3.5 K/UL (ref 1.8–8)
NEUTS SEG NFR BLD: 70 % (ref 32–75)
NRBC # BLD: 0 K/UL (ref 0–0.01)
NRBC BLD-RTO: 0 PER 100 WBC
PLATELET # BLD AUTO: 372 K/UL (ref 150–400)
PMV BLD AUTO: 7.9 FL (ref 8.9–12.9)
POTASSIUM SERPL-SCNC: 2.9 MMOL/L (ref 3.5–5.1)
PROT SERPL-MCNC: 6.8 G/DL (ref 6.4–8.2)
RBC # BLD AUTO: 3.55 M/UL (ref 3.8–5.2)
RBC MORPH BLD: ABNORMAL
SODIUM SERPL-SCNC: 136 MMOL/L (ref 136–145)
WBC # BLD AUTO: 5.1 K/UL (ref 3.6–11)

## 2022-09-22 PROCEDURE — 36415 COLL VENOUS BLD VENIPUNCTURE: CPT

## 2022-09-22 PROCEDURE — 74011250637 HC RX REV CODE- 250/637: Performed by: STUDENT IN AN ORGANIZED HEALTH CARE EDUCATION/TRAINING PROGRAM

## 2022-09-22 PROCEDURE — 85025 COMPLETE CBC W/AUTO DIFF WBC: CPT

## 2022-09-22 PROCEDURE — 86900 BLOOD TYPING SEROLOGIC ABO: CPT

## 2022-09-22 PROCEDURE — 2709999900 HC NON-CHARGEABLE SUPPLY

## 2022-09-22 PROCEDURE — 80053 COMPREHEN METABOLIC PANEL: CPT

## 2022-09-22 PROCEDURE — 74177 CT ABD & PELVIS W/CONTRAST: CPT

## 2022-09-22 PROCEDURE — 99285 EMERGENCY DEPT VISIT HI MDM: CPT

## 2022-09-22 PROCEDURE — G0378 HOSPITAL OBSERVATION PER HR: HCPCS

## 2022-09-22 PROCEDURE — P9016 RBC LEUKOCYTES REDUCED: HCPCS

## 2022-09-22 PROCEDURE — 74011250636 HC RX REV CODE- 250/636: Performed by: STUDENT IN AN ORGANIZED HEALTH CARE EDUCATION/TRAINING PROGRAM

## 2022-09-22 PROCEDURE — 96374 THER/PROPH/DIAG INJ IV PUSH: CPT

## 2022-09-22 PROCEDURE — 86923 COMPATIBILITY TEST ELECTRIC: CPT

## 2022-09-22 PROCEDURE — 74011000636 HC RX REV CODE- 636: Performed by: EMERGENCY MEDICINE

## 2022-09-22 PROCEDURE — 36430 TRANSFUSION BLD/BLD COMPNT: CPT

## 2022-09-22 RX ORDER — POTASSIUM CHLORIDE 750 MG/1
40 TABLET, FILM COATED, EXTENDED RELEASE ORAL
Status: COMPLETED | OUTPATIENT
Start: 2022-09-22 | End: 2022-09-22

## 2022-09-22 RX ORDER — SODIUM CHLORIDE 9 MG/ML
250 INJECTION, SOLUTION INTRAVENOUS AS NEEDED
Status: DISCONTINUED | OUTPATIENT
Start: 2022-09-22 | End: 2022-09-24 | Stop reason: HOSPADM

## 2022-09-22 RX ORDER — POLYETHYLENE GLYCOL 3350 17 G/17G
17 POWDER, FOR SOLUTION ORAL DAILY PRN
Status: DISCONTINUED | OUTPATIENT
Start: 2022-09-22 | End: 2022-09-24 | Stop reason: HOSPADM

## 2022-09-22 RX ORDER — ACETAMINOPHEN 650 MG/1
650 SUPPOSITORY RECTAL
Status: DISCONTINUED | OUTPATIENT
Start: 2022-09-22 | End: 2022-09-24 | Stop reason: HOSPADM

## 2022-09-22 RX ORDER — SODIUM CHLORIDE 0.9 % (FLUSH) 0.9 %
5-40 SYRINGE (ML) INJECTION EVERY 8 HOURS
Status: DISCONTINUED | OUTPATIENT
Start: 2022-09-22 | End: 2022-09-24 | Stop reason: HOSPADM

## 2022-09-22 RX ORDER — ONDANSETRON 4 MG/1
4 TABLET, ORALLY DISINTEGRATING ORAL
Status: DISCONTINUED | OUTPATIENT
Start: 2022-09-22 | End: 2022-09-24 | Stop reason: HOSPADM

## 2022-09-22 RX ORDER — AMLODIPINE BESYLATE 5 MG/1
5 TABLET ORAL DAILY
Status: DISCONTINUED | OUTPATIENT
Start: 2022-09-23 | End: 2022-09-24 | Stop reason: HOSPADM

## 2022-09-22 RX ORDER — SODIUM CHLORIDE 0.9 % (FLUSH) 0.9 %
5-40 SYRINGE (ML) INJECTION AS NEEDED
Status: DISCONTINUED | OUTPATIENT
Start: 2022-09-22 | End: 2022-09-24 | Stop reason: HOSPADM

## 2022-09-22 RX ORDER — ACETAMINOPHEN 325 MG/1
650 TABLET ORAL
Status: DISCONTINUED | OUTPATIENT
Start: 2022-09-22 | End: 2022-09-24 | Stop reason: HOSPADM

## 2022-09-22 RX ORDER — ONDANSETRON 2 MG/ML
4 INJECTION INTRAMUSCULAR; INTRAVENOUS
Status: DISCONTINUED | OUTPATIENT
Start: 2022-09-22 | End: 2022-09-24 | Stop reason: HOSPADM

## 2022-09-22 RX ADMIN — METHYLPREDNISOLONE SODIUM SUCCINATE 40 MG: 40 INJECTION, POWDER, FOR SOLUTION INTRAMUSCULAR; INTRAVENOUS at 20:20

## 2022-09-22 RX ADMIN — POTASSIUM CHLORIDE 40 MEQ: 750 TABLET, FILM COATED, EXTENDED RELEASE ORAL at 18:08

## 2022-09-22 RX ADMIN — IOPAMIDOL 100 ML: 755 INJECTION, SOLUTION INTRAVENOUS at 18:24

## 2022-09-22 NOTE — ED PROVIDER NOTES
Patient is a 56year old female with history of crohn's, anemia, HTN, pSVT, anal fistula who presents to ED due to abnormal labs. Patient reports she had labs drawn at her PCP and was told her hgb 7.4. she has been experiencing fatigue, generalized weakness and had dark, tarry stools a few weeks prior but states this has resolved. Patient reports she requested an infusion from her PCP, but they were unable to schedule it. Patient reports she has warranted blood transfusions in the past. She reports her abdominal pain has greatly increased from her baseline for crohn's. She is on stelara and reports concern this is no longer working. She denies any fever, chills, chest pain, shortness of breath, syncope, difficulty breathing, N/V/D, constipation, abdominal pain, urinary symptoms. Past Medical History:   Diagnosis Date    Anal fistula     Anemia     Crohn's disease ([address]     H/O prior ablation treatment     H/O tubal ligation     History of cholecystectomy     History of echocardiogram 12/19/2011    EF 60-65%. No significant valvular heart disease. Hypertension     Normal nuclear stress test 12/19/2011    No ischemia or prior infarction. EF 62%. Equivocal EKG on max EST. Ex time 6:31. PSVT (paroxysmal supraventricular tachycardia) (HCC)        Past Surgical History:   Procedure Laterality Date    HX ADENOIDECTOMY      HX CHOLECYSTECTOMY      HX GI  5/5/2011    Incision & Drainage of perirectal abscess w/ placement of seton    HX GI  2/17/2011    Incision & Drainage of perirectal abscess w/ placement of seton.     HX TUBAL LIGATION      [facility] CARDI[facility] SURG PROCEDURE UNLIST      cardiac ablation for svt         Family History:   Problem Relation Age of Onset    Breast Cancer Mother 54    Cancer Mother         breast    Hypertension Father     Pacemaker Father 80    Depression Daughter     Alcohol abuse Sister     Drug Abuse Sister        Social History     Socioeconomic History    Marital status: SINGLE Spouse name: Not on file    Number of children: Not on file    Years of education: Not on file    Highest education level: Not on file   Occupational History    Not on file   Tobacco Use    Smoking status: Never    Smokeless tobacco: Never   Substance and Sexual Activity    Alcohol use: Yes     Comment: rarely    Drug use: No    Sexual activity: Not Currently   Other Topics Concern    Not on file   Social History Narrative    Not on file     Social Determinants of Health     Financial Resource Strain: Not on file   Food Insecurity: Not on file   Transportation Needs: Not on file   Physical Activity: Not on file   Stress: Not on file   Social Connections: Not on file   Intimate Partner Violence: Not on file   Housing Stability: Not on file         ALLERGIES: Lisinopril    Review of Systems   Constitutional:  Positive for fatigue. Negative for activity change, appetite change, chills and fever. Positive for generalized weakness   HENT:  Negative for congestion and sore throat. Eyes:  Negative for pain and visual disturbance. Respiratory:  Negative for cough and shortness of breath. Cardiovascular:  Negative for chest pain, palpitations and leg swelling. Gastrointestinal:  Positive for blood in stool. Negative for abdominal distention, abdominal pain, anal bleeding, constipation, diarrhea, nausea, rectal pain and vomiting. Genitourinary:  Negative for decreased urine volume, dysuria, flank pain, frequency and urgency. Musculoskeletal:  Negative for back pain and neck pain. Skin:  Negative for rash and wound. Allergic/Immunologic: Negative for immunocompromised state. Neurological:  Negative for dizziness, syncope, weakness, light-headedness, numbness and headaches. Psychiatric/Behavioral:  Negative for confusion. All other systems reviewed and are negative.     Vitals:    09/22/22 1656   BP: (!) 143/85   Pulse: 85   Resp: 16   Temp: 98.8 °F (37.1 °C)   SpO2: 97%   Weight: 75.8 kg (167 lb) Height: 5' 6\" (1.676 m)            Physical Exam  Vitals and nursing note reviewed. Exam conducted with a chaperone present. Constitutional:       General: She is not in acute distress. Appearance: Normal appearance. She is well-developed. She is not toxic-appearing. HENT:      Head: Normocephalic and atraumatic. Nose: Nose normal.      Mouth/Throat:      Mouth: Mucous membranes are moist.   Eyes:      General: Lids are normal.      Extraocular Movements: Extraocular movements intact. Conjunctiva/sclera: Conjunctivae normal.   Cardiovascular:      Rate and Rhythm: Normal rate and regular rhythm. Pulses: Normal pulses. Heart sounds: Normal heart sounds, S1 normal and S2 normal.   Pulmonary:      Effort: Pulmonary effort is normal. No accessory muscle usage. Breath sounds: Normal breath sounds. Abdominal:      General: Bowel sounds are normal.      Palpations: Abdomen is soft. Tenderness: There is no abdominal tenderness. There is no right CVA tenderness, left CVA tenderness, guarding or rebound. Genitourinary:     Comments: Rectum: fistula present. Bright red blood noted on rectum. NAVEEN deferred   Musculoskeletal:         General: Normal range of motion. Cervical back: Normal range of motion and neck supple. Skin:     General: Skin is warm and dry. Capillary Refill: Capillary refill takes less than 2 seconds. Neurological:      General: No focal deficit present. Mental Status: She is alert and oriented to person, place, and time. Mental status is at baseline. Psychiatric:         Attention and Perception: Attention normal.         Mood and Affect: Mood and affect normal.         Speech: Speech normal.         Behavior: Behavior is cooperative. Thought Content:  Thought content normal.         Cognition and Memory: Cognition normal.         Judgment: Judgment normal.        MDM  Number of Diagnoses or Management Options  Anemia, unspecified type  Crohn's disease of colon with rectal bleeding ([facility] [address]  Hypokalemia  Diagnosis management comments: Patient with history as described above presents due to abnormal labs. Hgb 7.5 today. Patient is also hypokalemic. Deferred NAVEEN given extensive anal fistula. CT showed inflammatory colitis with substantial progression of colitis throughout cecum and ascending colon and stable extent within distal descending colon through rectum. Will plan to admit patient given severity of progression of crohn's flare, rectal bleeding and anemia. Will start solumedrol. Patient understands and agrees with plan. All questions addressed and answered. Perfect Serve Consult for Admission  7:25 PM    ED Room Number: CW/CW  Patient Name and age:  Morenita Caballero 56 y.o.  female  Working Diagnosis: Crohn's disease of colon with rectal bleeding ([facility] [address]  (primary encounter diagnosis)  Anemia, unspecified type  Hypokalemia    COVID-19 Suspicion:  no  Sepsis present:  no  Reassessment needed: yes  Code Status:  Full Code  Readmission: no  Isolation Requirements:  no  Recommended Level of Care:  med/surg  Department:[facility] - [phone]  Other:             Amount and/or Complexity of Data Reviewed  Clinical lab tests: reviewed  Tests in the radiology section of CPT®: reviewed  Discuss the patient with other providers: yes (Dr. Alyson Fung, ED attending )      ED Course as of 09/22/22 1921   Thu Sep 22, 2022   1741 CBC WITH AUTOMATED DIFF(!):    WBC 5.1   RBC 3.55(!)   HGB 7.5(!)   HCT 25.6(!)   MCV 72.1(!)   MCH 21.1(!)   MCHC 29.3(!)   RDW 15.9(!)   PLATELET 301   MPV 7.9(!)   NRBC 0.0   ABSOLUTE NRBC 0.00   NEUTROPHILS PENDING   LYMPHOCYTES PENDING   MONOCYTES PENDING   EOSINOPHILS PENDING   BASOPHILS PENDING   IMMATURE GRANULOCYTES PENDING   ABS. NEUTROPHILS PENDING   ABS. LYMPHOCYTES PENDING   ABS. MONOCYTES PENDING   ABS. EOSINOPHILS PENDING   ABS. BASOPHILS PENDING   ABS. IMM. GRANS.  PENDING   DF PENDING [KG]   1913 CT ABD PELV W CONT: IMPRESSION  Inflammatory colitis with substantial progression of colitis throughout cecum  and ascending colon and stable extent within distal descending colon through  rectum. [KG]   1920 COMPREHENSIVE METABOLIC PANEL(!):    Sodium 136   Potassium 2.9(!)   Chloride 101   CO2 29   Anion gap 6   Glucose 133(!)   BUN 7   Creatinine 0.67   BUN/Creatinine ratio 10(!)   GFR est AA >60   GFR est non-AA >60   Calcium 8.4(!)   Bilirubin, total 0.2   ALT 9(!)   AST 9(!)   Alk.  phosphatase 75   Protein, total 6.8   Albumin 1.9(!)   Globulin 4.9(!)   A-G Ratio 0.4(!) [KG]      ED Course User Index  [KG] Kvng Junes, 0809 Jennifer Longo       Procedures

## 2022-09-22 NOTE — ED TRIAGE NOTES
Pt to ED sent by pcp for hgb 7. 4. pt reports black tarry stools last few weeks. Hx of crohns and need for blood transfusion in the past. Denies cp, sob.

## 2022-09-23 ENCOUNTER — APPOINTMENT (OUTPATIENT)
Dept: MRI IMAGING | Age: 57
End: 2022-09-23
Attending: NURSE PRACTITIONER
Payer: COMMERCIAL

## 2022-09-23 LAB
ABO + RH BLD: NORMAL
ANION GAP SERPL CALC-SCNC: 5 MMOL/L (ref 5–15)
APPEARANCE UR: CLEAR
BASOPHILS # BLD: 0 K/UL (ref 0–0.1)
BASOPHILS NFR BLD: 0 % (ref 0–1)
BILIRUB UR QL: NEGATIVE
BLD PROD TYP BPU: NORMAL
BLOOD GROUP ANTIBODIES SERPL: NORMAL
BPU ID: NORMAL
BUN SERPL-MCNC: 8 MG/DL (ref 6–20)
BUN/CREAT SERPL: 13 (ref 12–20)
CALCIUM SERPL-MCNC: 8.7 MG/DL (ref 8.5–10.1)
CHLORIDE SERPL-SCNC: 106 MMOL/L (ref 97–108)
CO2 SERPL-SCNC: 27 MMOL/L (ref 21–32)
COLOR UR: ABNORMAL
CREAT SERPL-MCNC: 0.62 MG/DL (ref 0.55–1.02)
CROSSMATCH RESULT,%XM: NORMAL
DIFFERENTIAL METHOD BLD: ABNORMAL
EOSINOPHIL # BLD: 0 K/UL (ref 0–0.4)
EOSINOPHIL NFR BLD: 0 % (ref 0–7)
ERYTHROCYTE [DISTWIDTH] IN BLOOD BY AUTOMATED COUNT: 16.1 % (ref 11.5–14.5)
EST. AVERAGE GLUCOSE BLD GHB EST-MCNC: 137 MG/DL
GLUCOSE BLD STRIP.AUTO-MCNC: 306 MG/DL (ref 65–117)
GLUCOSE SERPL-MCNC: 261 MG/DL (ref 65–100)
GLUCOSE UR STRIP.AUTO-MCNC: 100 MG/DL
HBA1C MFR BLD: 6.4 % (ref 4–5.6)
HCT VFR BLD AUTO: 28.5 % (ref 35–47)
HGB BLD-MCNC: 8.5 G/DL (ref 11.5–16)
HGB UR QL STRIP: NEGATIVE
IMM GRANULOCYTES # BLD AUTO: 0 K/UL (ref 0–0.04)
IMM GRANULOCYTES NFR BLD AUTO: 0 % (ref 0–0.5)
KETONES UR QL STRIP.AUTO: NEGATIVE MG/DL
LEUKOCYTE ESTERASE UR QL STRIP.AUTO: NEGATIVE
LYMPHOCYTES # BLD: 0.5 K/UL (ref 0.8–3.5)
LYMPHOCYTES NFR BLD: 9 % (ref 12–49)
MCH RBC QN AUTO: 21.9 PG (ref 26–34)
MCHC RBC AUTO-ENTMCNC: 29.8 G/DL (ref 30–36.5)
MCV RBC AUTO: 73.5 FL (ref 80–99)
MONOCYTES # BLD: 0.1 K/UL (ref 0–1)
MONOCYTES NFR BLD: 2 % (ref 5–13)
NEUTS SEG # BLD: 4.7 K/UL (ref 1.8–8)
NEUTS SEG NFR BLD: 88 % (ref 32–75)
NITRITE UR QL STRIP.AUTO: NEGATIVE
NRBC # BLD: 0 K/UL (ref 0–0.01)
NRBC BLD-RTO: 0 PER 100 WBC
PH UR STRIP: 7 [PH] (ref 5–8)
PLATELET # BLD AUTO: 352 K/UL (ref 150–400)
PMV BLD AUTO: 8 FL (ref 8.9–12.9)
POTASSIUM SERPL-SCNC: 3.6 MMOL/L (ref 3.5–5.1)
PROT UR STRIP-MCNC: NEGATIVE MG/DL
RBC # BLD AUTO: 3.88 M/UL (ref 3.8–5.2)
SERVICE CMNT-IMP: ABNORMAL
SODIUM SERPL-SCNC: 138 MMOL/L (ref 136–145)
SP GR UR REFRACTOMETRY: 1.01 (ref 1–1.03)
SPECIMEN EXP DATE BLD: NORMAL
STATUS OF UNIT,%ST: NORMAL
UNIT DIVISION, %UDIV: 0
UROBILINOGEN UR QL STRIP.AUTO: 0.2 EU/DL (ref 0.2–1)
WBC # BLD AUTO: 5.3 K/UL (ref 3.6–11)

## 2022-09-23 PROCEDURE — 83036 HEMOGLOBIN GLYCOSYLATED A1C: CPT

## 2022-09-23 PROCEDURE — 74011250637 HC RX REV CODE- 250/637: Performed by: INTERNAL MEDICINE

## 2022-09-23 PROCEDURE — 85025 COMPLETE CBC W/AUTO DIFF WBC: CPT

## 2022-09-23 PROCEDURE — G0378 HOSPITAL OBSERVATION PER HR: HCPCS

## 2022-09-23 PROCEDURE — 82962 GLUCOSE BLOOD TEST: CPT

## 2022-09-23 PROCEDURE — 80048 BASIC METABOLIC PNL TOTAL CA: CPT

## 2022-09-23 PROCEDURE — 74011636637 HC RX REV CODE- 636/637: Performed by: HOSPITALIST

## 2022-09-23 PROCEDURE — A9576 INJ PROHANCE MULTIPACK: HCPCS | Performed by: RADIOLOGY

## 2022-09-23 PROCEDURE — 96376 TX/PRO/DX INJ SAME DRUG ADON: CPT

## 2022-09-23 PROCEDURE — 36415 COLL VENOUS BLD VENIPUNCTURE: CPT

## 2022-09-23 PROCEDURE — 74011250636 HC RX REV CODE- 250/636: Performed by: INTERNAL MEDICINE

## 2022-09-23 PROCEDURE — 72197 MRI PELVIS W/O & W/DYE: CPT

## 2022-09-23 PROCEDURE — 74011000250 HC RX REV CODE- 250: Performed by: INTERNAL MEDICINE

## 2022-09-23 PROCEDURE — 74011250636 HC RX REV CODE- 250/636: Performed by: RADIOLOGY

## 2022-09-23 PROCEDURE — 81003 URINALYSIS AUTO W/O SCOPE: CPT

## 2022-09-23 RX ORDER — MAGNESIUM SULFATE 100 %
4 CRYSTALS MISCELLANEOUS AS NEEDED
Status: DISCONTINUED | OUTPATIENT
Start: 2022-09-23 | End: 2022-09-24 | Stop reason: HOSPADM

## 2022-09-23 RX ORDER — INSULIN LISPRO 100 [IU]/ML
INJECTION, SOLUTION INTRAVENOUS; SUBCUTANEOUS
Status: DISCONTINUED | OUTPATIENT
Start: 2022-09-23 | End: 2022-09-23

## 2022-09-23 RX ORDER — INSULIN LISPRO 100 [IU]/ML
INJECTION, SOLUTION INTRAVENOUS; SUBCUTANEOUS
Status: DISCONTINUED | OUTPATIENT
Start: 2022-09-23 | End: 2022-09-24 | Stop reason: HOSPADM

## 2022-09-23 RX ORDER — DEXTROSE MONOHYDRATE 100 MG/ML
0-250 INJECTION, SOLUTION INTRAVENOUS AS NEEDED
Status: DISCONTINUED | OUTPATIENT
Start: 2022-09-23 | End: 2022-09-24 | Stop reason: HOSPADM

## 2022-09-23 RX ADMIN — METHYLPREDNISOLONE SODIUM SUCCINATE 40 MG: 40 INJECTION, POWDER, FOR SOLUTION INTRAMUSCULAR; INTRAVENOUS at 13:02

## 2022-09-23 RX ADMIN — Medication 1 CAPSULE: at 08:55

## 2022-09-23 RX ADMIN — AMLODIPINE BESYLATE 5 MG: 5 TABLET ORAL at 08:55

## 2022-09-23 RX ADMIN — INSULIN LISPRO 4 UNITS: 100 INJECTION, SOLUTION INTRAVENOUS; SUBCUTANEOUS at 22:03

## 2022-09-23 RX ADMIN — GADOTERIDOL 15 ML: 279.3 INJECTION, SOLUTION INTRAVENOUS at 18:43

## 2022-09-23 RX ADMIN — Medication 10 ML: at 06:00

## 2022-09-23 RX ADMIN — METHYLPREDNISOLONE SODIUM SUCCINATE 40 MG: 40 INJECTION, POWDER, FOR SOLUTION INTRAMUSCULAR; INTRAVENOUS at 03:44

## 2022-09-23 NOTE — PROGRESS NOTES
Hospitalist Group                                                                                          Hospitalist Progress Note  Mason Browne MD        Date of Service:  2022  NAME:  Javad López  :  1965  MRN:  [id number]      Admission Summary:   Admitted for transfusion and evaluation of Crohn's colitis       Interval history / Subjective:   Patient feels better after transfusion 1 unit packed red blood cells, still has daily diarrhea     Assessment & Plan:         #Acute blood loss anemia  Patient with acute blood loss anemia, likely secondary to Crohn's colitis. Was sent in to the emergency room for transfusion yesterday from her primary care physician's office. Hemoglobin at that time was 7.5, patient received 1 unit packed red blood cells and hemoglobin today is 8.5. No signs of ongoing active bleeding    #Crohn's colitis  Patient with longstanding history of Crohn's colitis. -CT shows involvement of the rectum all the way to the ascending colon.  -Patient has intermittent melena which is probably responsible for acute blood loss anemia as above  -Patient is unable to get into see her gastroenterologist, we are awaiting input from the inpatient gastroenterologist as to neck steps and treatment options at this time. #Hypertension/PSVT  Resume PTA Norvasc 5 mg daily    #Elevated blood sugars  Blood sugars in the 260s with an A1c of 6.4  -This will require ongoing management as an outpatient  -Will not start metformin at this time given patient's bowel issues, will refer her back to her primary physician for management of the blood sugars.     #Hypokalemia  Has been replaced with IV potassium, likely secondary to diarrhea from Crohn's as above           Code status: Full  Prophylaxis: SCDs only given GI bleed as above  Care Plan discussed with: Nursing, patient  Anticipated Disposition: [address] Problems  Date Reviewed: 2020            Codes Class Noted POA Anemia ICD-10-CM: D64.9  ICD-9-CM: 285.9  9/22/2022 Unknown             Review of Systems:   A comprehensive review of systems was negative except for that written in the HPI. Vital Signs:    Last 24hrs VS reviewed since prior progress note. Most recent are:  Visit Vitals  BP (!) 157/84   Pulse 86   Temp 98.6 °F (37 °C)   Resp 18   Ht 5' 6\" (1.676 m)   Wt 75.8 kg (167 lb)   SpO2 92%   BMI 26.95 kg/m²         Intake/Output Summary (Last 24 hours) at 9/23/2022 1513  Last data filed at 9/23/2022 0151  Gross per 24 hour   Intake 319 ml   Output --   Net 319 ml        Physical Examination:     I had a face to face encounter with this patient and independently examined them on 9/23/2022 as outlined below:          Constitutional:  No acute distress, cooperative, pleasant    ENT:  Oral mucosa moist, oropharynx benign. Resp:  CTA bilaterally. No wheezing/rhonchi/rales. No accessory muscle use. CV:  Regular rhythm, normal rate, no murmurs, gallops, rubs    GI:  Soft, non distended, non tender. normoactive bowel sounds, no hepatosplenomegaly     Musculoskeletal:  No edema, warm, 2+ pulses throughout    Neurologic:  Moves all extremities. AAOx3, CN II-XII reviewed            Data Review:    Review and/or order of clinical lab test      Labs:     Recent Labs     09/23/22  0434 09/22/22  1714   WBC 5.3 5.1   HGB 8.5* 7.5*   HCT 28.5* 25.6*    372     Recent Labs     09/23/22  0434 09/22/22  1714    136   K 3.6 2.9*    101   CO2 27 29   BUN 8 7   CREA 0.62 0.67   * 133*   CA 8.7 8.4*     Recent Labs     09/22/22  1714   ALT 9*   AP 75   TBILI 0.2   TP 6.8   ALB 1.9*   GLOB 4.9*     No results for input(s): INR, PTP, APTT, INREXT in the last 72 hours. No results for input(s): FE, TIBC, PSAT, FERR in the last 72 hours. Lab Results   Component Value Date/Time    Folate >24.0 (H) 02/03/2016 10:07 AM      No results for input(s): PH, PCO2, PO2 in the last 72 hours.   No results for input(s): CPK, CKNDX, TROIQ in the last 72 hours.     No lab exists for component: CPKMB  Lab Results   Component Value Date/Time    Cholesterol, total 191 05/01/2014 12:00 AM    HDL Cholesterol 50 05/01/2014 12:00 AM    LDL, calculated 117 (H) 05/01/2014 12:00 AM    Triglyceride 119 05/01/2014 12:00 AM     No results found for: GLUCPOC  No results found for: COLOR, APPRN, SPGRU, REFSG, FIFI, PROTU, GLUCU, KETU, BILU, UROU, JOSSELYN, LEUKU, GLUKE, EPSU, BACTU, WBCU, RBCU, CASTS, UCRY      Medications Reviewed:     Current Facility-Administered Medications   Medication Dose Route Frequency    glucose chewable tablet 16 g  4 Tablet Oral PRN    glucagon (GLUCAGEN) injection 1 mg  1 mg IntraMUSCular PRN    dextrose 10% infusion 0-250 mL  0-250 mL IntraVENous PRN    insulin lispro (HUMALOG) injection   SubCUTAneous AC&HS    sodium chloride (NS) flush 5-40 mL  5-40 mL IntraVENous Q8H    sodium chloride (NS) flush 5-40 mL  5-40 mL IntraVENous PRN    acetaminophen (TYLENOL) tablet 650 mg  650 mg Oral Q6H PRN    Or    acetaminophen (TYLENOL) suppository 650 mg  650 mg Rectal Q6H PRN    polyethylene glycol (MIRALAX) packet 17 g  17 g Oral DAILY PRN    ondansetron (ZOFRAN ODT) tablet 4 mg  4 mg Oral Q8H PRN    Or    ondansetron (ZOFRAN) injection 4 mg  4 mg IntraVENous Q6H PRN    0.9% sodium chloride infusion 250 mL  250 mL IntraVENous PRN    amLODIPine (NORVASC) tablet 5 mg  5 mg Oral DAILY    L.acidophilus-paracasei-S.thermophil-bifidobacter (RISAQUAD) 8 billion cell capsule  1 Capsule Oral DAILY    methylPREDNISolone (PF) (SOLU-MEDROL) injection 40 mg  40 mg IntraVENous Q8H     ______________________________________________________________________  EXPECTED LENGTH OF STAY: - - -  ACTUAL LENGTH OF STAY:          0                 Maximilian Chavarria MD

## 2022-09-23 NOTE — PROGRESS NOTES
09/23/22    State Observation Letter was verbally explained to patient and provided in writing to patient. The patient signed the document.

## 2022-09-23 NOTE — H&P
Hospitalist Admission Note    NAME:  Ranulfo Boateng   :  1965   MRN:  [id number]     Date/Time:  2022 8:49 PM    Patient PCP: None  ________________________________________________________________________    Given the patient's current clinical presentation, I have a high level of concern for decompensation if discharged from the emergency department. Complex decision making was performed, which includes reviewing the patient's available past medical records, laboratory results, and x-ray films. My assessment of this patient's clinical condition and my plan of care is as follows. Assessment / Plan:    Anemia:    The patient comes in on the request of her physician who request a transfusion and this was unable to be set up outpatient. Her anemia is secondary to uncontrolled Crohn's    VSS  WBC normal, Hg 7.5  BUN 7, Cr 0.67, K 2.9  CT Abdomen - inflammatory colitis w/ substantial progression of colitis in cecum    Obs patient and cont to monitor  Transfuse 1 unit PRBC at the request of her physician    2. Uncontrolled Crohn's:    This has been an on-going issue and she has not followed up w/ her GI. There has been progression despite being on Stelara. Start on Steroids and consult GI as she may require another MAB     Start Methylpredisone 40mg IV q8h and consider change to PO in AM.   Consult GI for further recommendations    3. HTN:    Cont w/ Norvasc 5mg daily      Body mass index is 26.95 kg/m².:  25.0 - 29.9:  Overweight      I have personally reviewed the radiographs, laboratory data in Epic and decisions and statements above are based partially on this personal interpretation.     Code Status: Full Code   Surrogate Decision Maker  Romayne Collin (daughter)  [phone]    Prophylaxis:  SCD's     Subjective:   CHIEF COMPLAINT: I need a transfusion    HISTORY OF PRESENT ILLNESS:       The patient is a 55 y/o C F w/ PMH Crohn's disease who comes into the ER under the advice of her physician who request she obtain a transfusion. On ROS she notes of chills and fever (101F); however these symptoms are reported to be chronic for the patient. On ROS she notes of dizziness and minor palpitations. In regard's to her Crohn's she is on Stelara. This did initially control her symptoms but is no longer working. She did have a gap in her infusion treatment due to insurance denials. She continues to have chronic abdominal pain which is a constant cramping w/o any aggravating, alleviating or radiating symptoms. She continues to have diarrhea w/ blood. These symptoms are also chronic as well and she not followed up w/ her GI Dr. Brendon Sanabria in regards to this. Past Medical History:   Diagnosis Date    Anal fistula     Anemia     Crohn's disease ([address]     H/O prior ablation treatment     H/O tubal ligation     History of cholecystectomy     History of echocardiogram 12/19/2011    EF 60-65%. No significant valvular heart disease. Hypertension     Normal nuclear stress test 12/19/2011    No ischemia or prior infarction. EF 62%. Equivocal EKG on max EST. Ex time 6:31. PSVT (paroxysmal supraventricular tachycardia) (HCC)       Past Surgical History:   Procedure Laterality Date    HX ADENOIDECTOMY      HX CHOLECYSTECTOMY      HX GI  5/5/2011    Incision & Drainage of perirectal abscess w/ placement of seton    HX GI  2/17/2011    Incision & Drainage of perirectal abscess w/ placement of seton.     HX TUBAL LIGATION      [place] CARDIAC SURG PROCEDURE UNLIST      cardiac ablation for svt     Social History     Tobacco Use    Smoking status: Never    Smokeless tobacco: Never   Substance Use Topics    Alcohol use: Yes     Comment: rarely      Family History   Problem Relation Age of Onset    Breast Cancer Mother 54    Cancer Mother         breast    Hypertension Father     Pacemaker Father 80    Depression Daughter     Alcohol abuse Sister     Drug Abuse Sister         Allergies   Allergen Reactions Lisinopril Cough        Prior to Admission medications    Medication Sig Start Date End Date Taking? Authorizing Provider   amLODIPine (NORVASC) 5 mg tablet TAKE 1 TABLET BY MOUTH DAILY. 10/10/17   Janette Dotson MD   triamcinolone (ARISTOCORT) 0.5 % topical cream Apply  to affected area two (2) times a day. use thin layer 5/11/17   Gracie Zimmerman MD   multivitamin (ONE A DAY) tablet Take 1 Tab by mouth daily. Nature Made Multi for Her 50+    Provider, Historical   L. ACIDOPHILUS/BIFIDO LONGUM (PROBIOTIC PEARLS PO) Take 1 Cap by mouth daily. Provider, Historical   cholecalciferol (VITAMIN D3) 1,000 unit cap Take 2,000 Units by mouth daily. Provider, Historical   iron, carbonyl (FEOSOL) 45 mg tab Take 1 Tab by mouth daily. Provider, Historical   acetaminophen (TYLENOL EXTRA STRENGTH) 500 mg tablet Take  by mouth every six (6) hours as needed for Pain. Provider, Historical   INFLIXIMAB (REMICADE IV) by IntraVENous route every six (6) weeks. Provider, Historical       REVIEW OF SYSTEMS:  See HPI for details  General: +fever, +chills, sweats, weakness, weight loss  Eyes: negative for blurred vision, eye pain, loss of vision, diplopia  Ear Nose and Throat: negative for rhinorrhea, pharyngitis, otalgia, +chronic tinnitus, speech or swallowing difficulties  Respiratory:  negative for pleuritic pain, cough, sputum production, wheezing, SOB, SARGENT  Cardiology:  negative for chest pain, + mild palpitations, orthopnea, PND, edema, syncope   Gastrointestinal: +chronic abdominal pain, N/V, dysphagia, +chronic bloody bowel movements.     Genitourinary: negative for frequency, urgency, dysuria, hematuria, incontinence  Muskuloskeletal : negative for arthralgia, myalgia  Hematology: negative for easy bruising, bleeding, lymphadenopathy  Dermatological: negative for rash, ulceration, mole change, new lesion  Endocrine: negative for hot flashes or polydipsia  Neurological: negative for headache, +dizziness, confusion, focal weakness, paresthesia, memory loss, gait disturbance  Psychological: negative for anxiety, depression, agitation    Objective:   VITALS:    Visit Vitals  BP (!) 143/85   Pulse 85   Temp 98.8 °F (37.1 °C)   Resp 16   Ht 5' 6\" (1.676 m)   Wt 75.8 kg (167 lb)   SpO2 97%   BMI 26.95 kg/m²     PHYSICAL EXAM:    Physical Exam:    Gen: Well-developed, well-nourished, in no acute distress  HEENT:  Pink conjunctivae, PERRL, hearing intact to voice, moist mucous membranes  Neck: Supple, without masses, thyroid non-tender  Resp: No accessory muscle use, clear breath sounds without wheezes rales or rhonchi  Card: No murmurs, normal S1, S2 without thrills, bruits or peripheral edema  Abd:  Soft, non-tender, non-distended, normoactive bowel sounds are present, no palpable organomegaly and no detectable hernias  Lymph:  No cervical or inguinal adenopathy  Musc: No cyanosis or clubbing  Skin: No rashes or ulcers, skin turgor is good  Neuro:  Cranial nerves are grossly intact, no focal motor weakness, follows commands appropriately  Psych:  Good insight, oriented to person, place and time, alert  _______________________________________________________________________  Care Plan discussed with:  Pt's condition, Imaging findings, Lab findings, Assessment, and Care Plan discussed with: Patient  _______________________________________________________________________    Probable disposition:  Home with family  ________________________________________________________________________            Comments   >50% of visit spent in counseling and coordination of care  Chart reviewed  Discussion with patient and/or family and questions answered     ________________________________________________________________________  Signed: Antonio Garay MD        Procedures: see electronic medical records for all procedures/Xrays and details which were not copied into this note but were reviewed prior to creation of Plan.     LAB DATA REVIEWED:    Recent Results (from the past 24 hour(s))   CBC WITH AUTOMATED DIFF    Collection Time: 09/22/22  5:14 PM   Result Value Ref Range    WBC 5.1 3.6 - 11.0 K/uL    RBC 3.55 (L) 3.80 - 5.20 M/uL    HGB 7.5 (L) 11.5 - 16.0 g/dL    HCT 25.6 (L) 35.0 - 47.0 %    MCV 72.1 (L) 80.0 - 99.0 FL    MCH 21.1 (L) 26.0 - 34.0 PG    MCHC 29.3 (L) 30.0 - 36.5 g/dL    RDW 15.9 (H) 11.5 - 14.5 %    PLATELET 439 098 - 580 K/uL    MPV 7.9 (L) 8.9 - 12.9 FL    NRBC 0.0 0  WBC    ABSOLUTE NRBC 0.00 0.00 - 0.01 K/uL    NEUTROPHILS 70 32 - 75 %    LYMPHOCYTES 15 12 - 49 %    MONOCYTES 10 5 - 13 %    EOSINOPHILS 5 0 - 7 %    BASOPHILS 0 0 - 1 %    IMMATURE GRANULOCYTES 0 0.0 - 0.5 %    ABS. NEUTROPHILS 3.5 1.8 - 8.0 K/UL    ABS. LYMPHOCYTES 0.8 0.8 - 3.5 K/UL    ABS. MONOCYTES 0.5 0.0 - 1.0 K/UL    ABS. EOSINOPHILS 0.3 0.0 - 0.4 K/UL    ABS. BASOPHILS 0.0 0.0 - 0.1 K/UL    ABS. IMM. GRANS. 0.0 0.00 - 0.04 K/UL    DF SMEAR SCANNED      RBC COMMENTS OVALOCYTES  PRESENT        RBC COMMENTS MICROCYTOSIS  1+        RBC COMMENTS HYPOCHROMIA  2+       TYPE & SCREEN    Collection Time: 09/22/22  5:14 PM   Result Value Ref Range    Crossmatch Expiration 09/25/2022,2359     ABO/Rh(D) A POSITIVE     Antibody screen NEG    METABOLIC PANEL, COMPREHENSIVE    Collection Time: 09/22/22  5:14 PM   Result Value Ref Range    Sodium 136 136 - 145 mmol/L    Potassium 2.9 (L) 3.5 - 5.1 mmol/L    Chloride 101 97 - 108 mmol/L    CO2 29 21 - 32 mmol/L    Anion gap 6 5 - 15 mmol/L    Glucose 133 (H) 65 - 100 mg/dL    BUN 7 6 - 20 MG/DL    Creatinine 0.67 0.55 - 1.02 MG/DL    BUN/Creatinine ratio 10 (L) 12 - 20      GFR est AA >60 >60 ml/min/1.73m2    GFR est non-AA >60 >60 ml/min/1.73m2    Calcium 8.4 (L) 8.5 - 10.1 MG/DL    Bilirubin, total 0.2 0.2 - 1.0 MG/DL    ALT (SGPT) 9 (L) 12 - 78 U/L    AST (SGOT) 9 (L) 15 - 37 U/L    Alk.  phosphatase 75 45 - 117 U/L    Protein, total 6.8 6.4 - 8.2 g/dL    Albumin 1.9 (L) 3.5 - 5.0 g/dL    Globulin 4.9 (H) 2.0 - 4.0 g/dL    A-G Ratio 0.4 (L) 1.1 - 2.2

## 2022-09-23 NOTE — CONSULTS
[address] [place] [address]. [address] [place] [address] NP  [phone]                    GASTROENTEROLOGY CONSULTATION NOTE              NAME:  Michelle Pineda   :   1965   MRN:   [id number]       Referring Physician:    Dr. Brianna Helms Date:   2022 4:16 PM    Chief Complaint:    Anemia, Crohns Disease     History of Present Illness:    Patient is a 56 y.o. who with past medical history of Crohns disease who presented to the ER for complaints of anemia. Reports she had been feeling weak and lethargic. Had dark stools for a few days prior to her admission. Her Crohns is being treated with Stelara. She received the induction infusion though was 4 weeks late getting her next dose. She reports sensation of passing gas trough her vagina. Her work up this admission includes CT scan showing Inflammatory colitis with substantial progression of colitis throughout cecum and ascending colon and stable extent within distal descending colon through rectum. PMH:  Past Medical History:   Diagnosis Date    Anal fistula     Anemia     Crohn's disease ([address]     H/O prior ablation treatment     H/O tubal ligation     History of cholecystectomy     History of echocardiogram 2011    EF 60-65%. No significant valvular heart disease. Hypertension     Normal nuclear stress test 2011    No ischemia or prior infarction. EF 62%. Equivocal EKG on max EST. Ex time 6:31. PSVT (paroxysmal supraventricular tachycardia) (HCC)        PSH:  Past Surgical History:   Procedure Laterality Date    HX ADENOIDECTOMY      HX CHOLECYSTECTOMY      HX GI  2011    Incision & Drainage of perirectal abscess w/ placement of seton    HX GI  2011    Incision & Drainage of perirectal abscess w/ placement of seton. HX TUBAL LIGATION      [place] CARDIAC SURG PROCEDURE UNLIST      cardiac ablation for svt       Allergies:   Allergies   Allergen Reactions    Lisinopril Cough       Home Medications:  Prior to Admission Medications   Prescriptions Last Dose Informant Patient Reported? Taking? INFLIXIMAB (REMICADE IV)   Yes No   Sig: by IntraVENous route every six (6) weeks. L. ACIDOPHILUS/BIFIDO LONGUM (PROBIOTIC PEARLS PO)   Yes No   Sig: Take 1 Cap by mouth daily. acetaminophen (TYLENOL EXTRA STRENGTH) 500 mg tablet   Yes No   Sig: Take  by mouth every six (6) hours as needed for Pain. amLODIPine (NORVASC) 5 mg tablet   No No   Sig: TAKE 1 TABLET BY MOUTH DAILY. cholecalciferol (VITAMIN D3) 1,000 unit cap   Yes No   Sig: Take 2,000 Units by mouth daily. iron, carbonyl (FEOSOL) 45 mg tab   Yes No   Sig: Take 1 Tab by mouth daily. multivitamin (ONE A DAY) tablet   Yes No   Sig: Take 1 Tab by mouth daily. Nature Made Multi for Her 50+   triamcinolone (ARISTOCORT) 0.5 % topical cream   No No   Sig: Apply  to affected area two (2) times a day.  use thin layer      Facility-Administered Medications: None       Hospital Medications:  Current Facility-Administered Medications   Medication Dose Route Frequency    glucose chewable tablet 16 g  4 Tablet Oral PRN    glucagon (GLUCAGEN) injection 1 mg  1 mg IntraMUSCular PRN    dextrose 10% infusion 0-250 mL  0-250 mL IntraVENous PRN    insulin lispro (HUMALOG) injection   SubCUTAneous AC&HS    sodium chloride (NS) flush 5-40 mL  5-40 mL IntraVENous Q8H    sodium chloride (NS) flush 5-40 mL  5-40 mL IntraVENous PRN    acetaminophen (TYLENOL) tablet 650 mg  650 mg Oral Q6H PRN    Or    acetaminophen (TYLENOL) suppository 650 mg  650 mg Rectal Q6H PRN    polyethylene glycol (MIRALAX) packet 17 g  17 g Oral DAILY PRN    ondansetron (ZOFRAN ODT) tablet 4 mg  4 mg Oral Q8H PRN    Or    ondansetron (ZOFRAN) injection 4 mg  4 mg IntraVENous Q6H PRN    0.9% sodium chloride infusion 250 mL  250 mL IntraVENous PRN    amLODIPine (NORVASC) tablet 5 mg  5 mg Oral DAILY    L.acidophilus-paracasei-S.thermophil-bifidobacter (RISAQUAD) 8 billion cell capsule  1 Capsule Oral DAILY methylPREDNISolone (PF) (SOLU-MEDROL) injection 40 mg  40 mg IntraVENous Q8H       Social History:  Social History     Tobacco Use    Smoking status: Never    Smokeless tobacco: Never   Substance Use Topics    Alcohol use: Yes     Comment: rarely       Family History:  Family History   Problem Relation Age of Onset    Breast Cancer Mother 54    Cancer Mother         breast    Hypertension Father     Pacemaker Father 80    Depression Daughter     Alcohol abuse Sister     Drug Abuse Sister        Review of Systems:  Constitutional: negative fever, negative chills, negative weight loss  Eyes:   negative visual changes  ENT:   negative sore throat, tongue or lip swelling  Respiratory:  negative cough, negative dyspnea  Cards:  negative for chest pain, palpitations, lower extremity edema  GI:   See HPI  :  negative for frequency, dysuria  Integument:  negative for rash and pruritus  Heme:  negative for easy bruising and gum/nose bleeding  Musculoskel: negative for myalgias,  back pain and muscle weakness  Neuro: negative for headaches, dizziness, vertigo  Psych:  negative for feelings of anxiety, depression     Objective:   Patient Vitals for the past 8 hrs:   BP Temp Pulse Resp SpO2   09/23/22 1200 (!) 157/84 98.6 °F (37 °C) 86 18 92 %   09/23/22 0844 (!) 160/84 98.4 °F (36.9 °C) 80 16 96 %     No intake/output data recorded. 09/21 1901 - 09/23 0700  In: 319   Out: -     EXAM:     NEURO-alert, oriented x3, affect appropriate, no focal neurological deficits, moves all extremities well, no involuntary movements, reflexes at knee and ankle intact   HEENT-Head: Normocephalic, no lesions, without obvious abnormality. LUNGS-clear to auscultation bilaterally    COR-regular rate and rhythym     ABD- soft, non-tender.  Bowel sounds normal. No masses,  no organomegaly     EXT-no edema    Skin - No rash     Data Review     Recent Labs     09/23/22  0434 09/22/22  1714   WBC 5.3 5.1   HGB 8.5* 7.5*   HCT 28.5* 25.6*    [address]     09/23/22  0434 09/22/22  1714    136   K 3.6 2.9*    101   CO2 27 29   BUN 8 7   CREA 0.62 0.67   * 133*   CA 8.7 8.4*     Recent Labs     09/22/22  1714   AP 75   TP 6.8   ALB 1.9*   GLOB 4.9*     No results for input(s): INR, PTP, APTT, INREXT in the last 72 hours. Patient Active Problem List   Diagnosis Code    Atrial fibrillation ([facility] I48.91    PSVT (paroxysmal supraventricular tachycardia) ([facility]) I47.1    Chest pain, unspecified R07.9    Crohn's disease ([facility] [address] [facility] K50.90    S/P ablation operation for arrhythmia Z98.890, Z86.79    Anal fistula K60.3    Rectovaginal fistula N82.3    Hypertension I10    S/P colonoscopy/ repeat in 5 years around 2020. / s/p sigmoidoscopy on 10/27/17. anal fissure  Z98.890    Prediabetes R73.03    Sleep apnea/ on c-pap following neurology  G47.30    Vitamin D deficiency E55.9    Anemia D64.9       Assessment and Plan:  Crohn's Disease, Anemia    - Diet as tolerated. - Continue to monitor hemoglobin  - Continue Soulmedrol 60 daily   - MRI of pelvis to rule out fistula as she reports passing gas through her vagina.  - No plans for endoscopic evaluation at this time  - Will see about getting repeat induction dose of Stelara as outpatient      Pt seen with Dr. Nikhil Sorto      WIll see as needed over the weekend. Please contact the on call GI provider with questions over the weekend. Thanks for allowing me to participate in the care of this patient.   Signed By: Fernando Monsivais NP     9/23/2022  4:16 PM

## 2022-09-23 NOTE — CONSENT
Informed Consent for Blood Component Transfusion Note    I have discussed with the patient the rationale for blood component transfusion; its benefits in treating or preventing fatigue, organ damage, or death; and its risk which includes mild transfusion reactions, rare risk of blood borne infection, or more serious but rare reactions. I have discussed the alternatives to transfusion, including the risk and consequences of not receiving transfusion. The patient had an opportunity to ask questions and had agreed to proceed with transfusion of blood components.     Electronically signed by Shereen Schultz MD on 9/23/22 at 5:31 AM

## 2022-09-24 VITALS
OXYGEN SATURATION: 99 % | HEART RATE: 75 BPM | BODY MASS INDEX: 26.84 KG/M2 | DIASTOLIC BLOOD PRESSURE: 104 MMHG | SYSTOLIC BLOOD PRESSURE: 158 MMHG | RESPIRATION RATE: 16 BRPM | TEMPERATURE: 98.7 F | HEIGHT: 66 IN | WEIGHT: 167 LBS

## 2022-09-24 LAB
ANION GAP SERPL CALC-SCNC: 3 MMOL/L (ref 5–15)
BASOPHILS # BLD: 0 K/UL (ref 0–0.1)
BASOPHILS NFR BLD: 0 % (ref 0–1)
BUN SERPL-MCNC: 6 MG/DL (ref 6–20)
BUN/CREAT SERPL: 11 (ref 12–20)
CALCIUM SERPL-MCNC: 8.6 MG/DL (ref 8.5–10.1)
CHLORIDE SERPL-SCNC: 106 MMOL/L (ref 97–108)
CO2 SERPL-SCNC: 33 MMOL/L (ref 21–32)
CREAT SERPL-MCNC: 0.54 MG/DL (ref 0.55–1.02)
DIFFERENTIAL METHOD BLD: ABNORMAL
EOSINOPHIL # BLD: 0 K/UL (ref 0–0.4)
EOSINOPHIL NFR BLD: 0 % (ref 0–7)
ERYTHROCYTE [DISTWIDTH] IN BLOOD BY AUTOMATED COUNT: 16.1 % (ref 11.5–14.5)
GLUCOSE BLD STRIP.AUTO-MCNC: 95 MG/DL (ref 65–117)
GLUCOSE SERPL-MCNC: 100 MG/DL (ref 65–100)
HCT VFR BLD AUTO: 29.6 % (ref 35–47)
HGB BLD-MCNC: 8.7 G/DL (ref 11.5–16)
IMM GRANULOCYTES # BLD AUTO: 0 K/UL (ref 0–0.04)
IMM GRANULOCYTES NFR BLD AUTO: 0 % (ref 0–0.5)
LYMPHOCYTES # BLD: 1.1 K/UL (ref 0.8–3.5)
LYMPHOCYTES NFR BLD: 19 % (ref 12–49)
MAGNESIUM SERPL-MCNC: 2 MG/DL (ref 1.6–2.4)
MCH RBC QN AUTO: 21.7 PG (ref 26–34)
MCHC RBC AUTO-ENTMCNC: 29.4 G/DL (ref 30–36.5)
MCV RBC AUTO: 73.8 FL (ref 80–99)
MONOCYTES # BLD: 0.4 K/UL (ref 0–1)
MONOCYTES NFR BLD: 7 % (ref 5–13)
NEUTS SEG # BLD: 4.3 K/UL (ref 1.8–8)
NEUTS SEG NFR BLD: 74 % (ref 32–75)
NRBC # BLD: 0 K/UL (ref 0–0.01)
NRBC BLD-RTO: 0 PER 100 WBC
PLATELET # BLD AUTO: 397 K/UL (ref 150–400)
PMV BLD AUTO: 8 FL (ref 8.9–12.9)
POTASSIUM SERPL-SCNC: 2.9 MMOL/L (ref 3.5–5.1)
RBC # BLD AUTO: 4.01 M/UL (ref 3.8–5.2)
SERVICE CMNT-IMP: NORMAL
SODIUM SERPL-SCNC: 142 MMOL/L (ref 136–145)
WBC # BLD AUTO: 5.9 K/UL (ref 3.6–11)

## 2022-09-24 PROCEDURE — 74011250637 HC RX REV CODE- 250/637: Performed by: INTERNAL MEDICINE

## 2022-09-24 PROCEDURE — 96376 TX/PRO/DX INJ SAME DRUG ADON: CPT

## 2022-09-24 PROCEDURE — 36415 COLL VENOUS BLD VENIPUNCTURE: CPT

## 2022-09-24 PROCEDURE — 83735 ASSAY OF MAGNESIUM: CPT

## 2022-09-24 PROCEDURE — 74011000250 HC RX REV CODE- 250: Performed by: INTERNAL MEDICINE

## 2022-09-24 PROCEDURE — 74011250636 HC RX REV CODE- 250/636: Performed by: NURSE PRACTITIONER

## 2022-09-24 PROCEDURE — 82962 GLUCOSE BLOOD TEST: CPT

## 2022-09-24 PROCEDURE — G0378 HOSPITAL OBSERVATION PER HR: HCPCS

## 2022-09-24 PROCEDURE — 74011250637 HC RX REV CODE- 250/637: Performed by: HOSPITALIST

## 2022-09-24 PROCEDURE — 80048 BASIC METABOLIC PNL TOTAL CA: CPT

## 2022-09-24 PROCEDURE — 85025 COMPLETE CBC W/AUTO DIFF WBC: CPT

## 2022-09-24 RX ORDER — OMEPRAZOLE 40 MG/1
40 CAPSULE, DELAYED RELEASE ORAL DAILY
Qty: 30 CAPSULE | Refills: 0 | Status: SHIPPED | OUTPATIENT
Start: 2022-09-24 | End: 2022-10-24

## 2022-09-24 RX ORDER — POTASSIUM CHLORIDE 750 MG/1
20 TABLET, FILM COATED, EXTENDED RELEASE ORAL
Status: COMPLETED | OUTPATIENT
Start: 2022-09-24 | End: 2022-09-24

## 2022-09-24 RX ORDER — PREDNISONE 20 MG/1
60 TABLET ORAL
Qty: 30 TABLET | Refills: 0 | Status: SHIPPED | OUTPATIENT
Start: 2022-09-24 | End: 2022-10-04

## 2022-09-24 RX ORDER — POTASSIUM CHLORIDE 750 MG/1
10 TABLET, EXTENDED RELEASE ORAL DAILY
Qty: 10 TABLET | Refills: 0 | Status: SHIPPED | OUTPATIENT
Start: 2022-09-24 | End: 2022-10-04

## 2022-09-24 RX ADMIN — AMLODIPINE BESYLATE 5 MG: 5 TABLET ORAL at 08:29

## 2022-09-24 RX ADMIN — POTASSIUM CHLORIDE 20 MEQ: 750 TABLET, FILM COATED, EXTENDED RELEASE ORAL at 10:06

## 2022-09-24 RX ADMIN — METHYLPREDNISOLONE SODIUM SUCCINATE 60 MG: 40 INJECTION, POWDER, FOR SOLUTION INTRAMUSCULAR; INTRAVENOUS at 08:30

## 2022-09-24 RX ADMIN — Medication 1 CAPSULE: at 08:29

## 2022-09-24 RX ADMIN — Medication 10 ML: at 08:30

## 2022-09-24 NOTE — DISCHARGE INSTRUCTIONS
Call GI on Monday to make an appointment for next week    Take prednisone, omeprazole, and potassium as directed    Return if symptoms get worse

## 2022-09-24 NOTE — DISCHARGE SUMMARY
Physician Discharge Summary     Patient ID:    Prabha Huizar  [id number]  [unfilled]  1965    Admit date: 9/22/2022    Discharge date and time: 9/24/2022    Hospital Diagnoses and Treatment Rendered:   Greater than 30 minutes spent providing discharge related services for this patient    Patient with a known history of Crohn's colitis comes in with acute blood loss anemia requiring transfusion and acute on chronic Crohn's flare. Patient underwent transfusion 1 unit packed red blood cells and her hemoglobin is now greater than 8. Patient was seen by GI service and started on steroids. Patient is to continue steroids after discharge and will follow up with GI as an outpatient for ongoing management of Crohn's colitis. Patient had an MRI of the pelvis which showed possible anal vaginal fistula. Patient's blood sugars remained elevated and her A1c was 6.4, outpatient management of her blood sugars to be handled by her primary care physician. Patient also had hypokalemia, likely related to ongoing diarrhea from her Crohn's, this was replaced with oral potassium. #Acute blood loss anemia  Patient with acute blood loss anemia, likely secondary to Crohn's colitis. Was sent in to the emergency room for transfusion yesterday from her primary care physician's office. Hemoglobin at that time was 7.5, patient received 1 unit packed red blood cells and hemoglobin today is 8.5. No signs of ongoing active bleeding     #Crohn's colitis  Patient with longstanding history of Crohn's colitis. -CT shows involvement of the rectum all the way to the ascending colon.  -Patient has intermittent melena which is probably responsible for acute blood loss anemia as above  -Patient is unable to get into see her gastroenterologist, we are awaiting input from the inpatient gastroenterologist as to neck steps and treatment options at this time.      #Hypertension/PSVT  Resume PTA Norvasc 5 mg daily     #Elevated blood sugars  Blood sugars in the 260s with an A1c of 6.4  -This will require ongoing management as an outpatient  -Will not start metformin at this time given patient's bowel issues, will refer her back to her primary physician for management of the blood sugars. #Hypokalemia  Has been replaced with IV potassium, likely secondary to diarrhea from Crohn's as above              Chronic Diagnoses:    Problem List as of 9/24/2022 Date Reviewed: 5/1/2020            Codes Class Noted - Resolved    Anemia ICD-10-CM: D64.9  ICD-9-CM: 285.9  9/22/2022 - Present        Prediabetes ICD-10-CM: R73.03  ICD-9-CM: 790.29  3/7/2016 - Present        Sleep apnea/ on c-pap following neurology  ICD-10-CM: G47.30  ICD-9-CM: 780.57  3/7/2016 - Present        Vitamin D deficiency ICD-10-CM: E55.9  ICD-9-CM: 268.9  3/7/2016 - Present        S/P colonoscopy/ repeat in 5 years around 2020. / s/p sigmoidoscopy on 10/27/17.  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.3  ICD-9-CM: 619.1  10/5/2012 - Present        Anal fistula ICD-10-CM: K60.3  ICD-9-CM: 565.1  Unknown - Present        S/P ablation operation for arrhythmia ICD-10-CM: Z98.890, Z86.79  ICD-9-CM: V45.89  9/7/2012 - Present        Crohn's disease ([facility] [address] ICD-10-CM: K50.90  ICD-9-CM: 555.9  7/3/2012 - Present        Atrial fibrillation ([facility] [address] ICD-10-CM: I48.91  ICD-9-CM: 427.31  Unknown - Present        PSVT (paroxysmal supraventricular tachycardia) (HCC) ICD-10-CM: I47.1  ICD-9-CM: 427.0  Unknown - Present        Chest pain, unspecified ICD-10-CM: R07.9  ICD-9-CM: 786.50  12/16/2011 - Present        RESOLVED: Essential hypertension, benign ICD-10-CM: I10  ICD-9-CM: 401.1  7/5/2012 - 8/3/2021           Discharge Medications:   Discharge Medication List as of 9/24/2022 10:15 AM        START taking these medications Details   predniSONE (DELTASONE) 20 mg tablet Take 3 Tablets by mouth daily (with breakfast) for 10 days. , Print, Disp-30 Tablet, R-0      omeprazole (PRILOSEC) 40 mg capsule Take 1 Capsule by mouth daily for 30 days. , Print, Disp-30 Capsule, R-0      potassium chloride (KLOR-CON M10) 10 mEq tablet Take 1 Tablet by mouth daily for 10 days. , Print, Disp-10 Tablet, R-0           CONTINUE these medications which have NOT CHANGED    Details   amLODIPine (NORVASC) 5 mg tablet TAKE 1 TABLET BY MOUTH DAILY., Normal, Disp-30 Tab, R-0      triamcinolone (ARISTOCORT) 0.5 % topical cream Apply  to affected area two (2) times a day. use thin layer, Normal, Disp-15 g, R-0      multivitamin (ONE A DAY) tablet Take 1 Tab by mouth daily. Nature Made Multi for Her 50+, Historical Med      L. ACIDOPHILUS/BIFIDO LONGUM (PROBIOTIC PEARLS PO) Take 1 Cap by mouth daily. , Historical Med      cholecalciferol (VITAMIN D3) 1,000 unit cap Take 2,000 Units by mouth daily. , Historical Med      iron, carbonyl (FEOSOL) 45 mg tab Take 1 Tab by mouth daily. , Historical Med      acetaminophen (TYLENOL EXTRA STRENGTH) 500 mg tablet Take  by mouth every six (6) hours as needed for Pain., Historical Med      INFLIXIMAB (REMICADE IV) by IntraVENous route every six (6) weeks. , Historical Med               Follow up Care:    1. None in 1-2 weeks. Please call to set up an appointment shortly after discharge. Diet:  Regular Diet    Disposition:  Home. Advanced Directive:   FULL x   DNR      Discharge Exam:    Gen: NAD  HEENT: OP clear  Abd: Benign  Ext: No CCE  Neuro: nonfocal  Psych: Pleasant  Skin: No rash    CONSULTATIONS: GI    Significant Diagnostic Studies:   9/22/2022: BUN 7 MG/DL (Ref range: 6 - 20 MG/DL); Calcium 8.4 MG/DL (L; Ref range: 8.5 - 10.1 MG/DL); CO2 29 mmol/L (Ref range: 21 - 32 mmol/L); Creatinine 0.67 MG/DL (Ref range: 0.55 - 1.02 MG/DL);  Glucose 133 mg/dL (H; Ref range: 65 - 100 mg/dL); HCT 25.6 % (L; Ref range: 35.0 - 47.0 %); HGB 7.5 g/dL (L; Ref range: 11.5 - 16.0 g/dL); Potassium 2.9 mmol/L (L; Ref range: 3.5 - 5.1 mmol/L); Sodium 136 mmol/L (Ref range: 136 - 145 mmol/L)  9/23/2022: BUN 8 MG/DL (Ref range: 6 - 20 MG/DL); Calcium 8.7 MG/DL (Ref range: 8.5 - 10.1 MG/DL); CO2 27 mmol/L (Ref range: 21 - 32 mmol/L); Creatinine 0.62 MG/DL (Ref range: 0.55 - 1.02 MG/DL); Glucose 261 mg/dL (H; Ref range: 65 - 100 mg/dL); HCT 28.5 % (L; Ref range: 35.0 - 47.0 %); HGB 8.5 g/dL (L; Ref range: 11.5 - 16.0 g/dL); Potassium 3.6 mmol/L (Ref range: 3.5 - 5.1 mmol/L); Sodium 138 mmol/L (Ref range: 136 - 145 mmol/L)  Recent Labs     09/24/22  0815 09/23/22  0434   WBC 5.9 5.3   HGB 8.7* 8.5*   HCT 29.6* 28.5*    352     Recent Labs     09/24/22  0815 09/23/22  0434 09/22/22  1714    138 136   K 2.9* 3.6 2.9*    106 101   CO2 33* 27 29   BUN 6 8 7   CREA 0.54* 0.62 0.67    261* 133*   CA 8.6 8.7 8.4*   MG 2.0  --   --      Recent Labs     09/22/22  1714   AP 75   TP 6.8   ALB 1.9*   GLOB 4.9*     No results for input(s): INR, PTP, APTT, INREXT in the last 72 hours. No results for input(s): FE, TIBC, PSAT, FERR in the last 72 hours. No results for input(s): PH, PCO2, PO2 in the last 72 hours. No results for input(s): CPK, CKMB in the last 72 hours.     No lab exists for component: TROPONINI  No components found for: [place]          Signed:  Angelique Vasquez MD  9/24/2022  3:11 PM

## 2022-10-12 ENCOUNTER — HOSPITAL ENCOUNTER (OUTPATIENT)
Dept: INFUSION THERAPY | Age: 57
Discharge: HOME | End: 2022-10-12

## 2022-10-18 RX ORDER — SODIUM CHLORIDE 9 MG/ML
25 INJECTION, SOLUTION INTRAVENOUS CONTINUOUS
Status: DISPENSED | OUTPATIENT
Start: 2022-10-26 | End: 2022-10-26

## 2022-10-25 RX ORDER — SODIUM CHLORIDE 9 MG/ML
25 INJECTION, SOLUTION INTRAVENOUS CONTINUOUS
Status: DISPENSED | OUTPATIENT
Start: 2022-11-02 | End: 2022-11-02

## 2022-10-26 ENCOUNTER — HOSPITAL ENCOUNTER (OUTPATIENT)
Dept: INFUSION THERAPY | Age: 57
Discharge: HOME | End: 2022-10-26
Payer: COMMERCIAL

## 2022-10-26 VITALS
OXYGEN SATURATION: 96 % | BODY MASS INDEX: 26.26 KG/M2 | SYSTOLIC BLOOD PRESSURE: 100 MMHG | WEIGHT: 163.4 LBS | TEMPERATURE: 98 F | RESPIRATION RATE: 16 BRPM | HEIGHT: 66 IN | DIASTOLIC BLOOD PRESSURE: 56 MMHG | HEART RATE: 65 BPM

## 2022-10-26 PROCEDURE — 96365 THER/PROPH/DIAG IV INF INIT: CPT

## 2022-10-26 PROCEDURE — 74011250636 HC RX REV CODE- 250/636: Performed by: NURSE PRACTITIONER

## 2022-10-26 RX ORDER — SERTRALINE HYDROCHLORIDE 100 MG/1
100 TABLET, FILM COATED ORAL DAILY
COMMUNITY

## 2022-10-26 RX ORDER — OMEPRAZOLE 20 MG/1
20 TABLET, DELAYED RELEASE ORAL DAILY
COMMUNITY

## 2022-10-26 RX ORDER — PREDNISONE 10 MG/1
TABLET ORAL
COMMUNITY

## 2022-10-26 RX ORDER — ALUMINUM ZIRCONIUM OCTACHLOROHYDREX GLY 16 G/100G
GEL TOPICAL
COMMUNITY

## 2022-10-26 RX ADMIN — FERRIC CARBOXYMALTOSE INJECTION 750 MG: 50 INJECTION, SOLUTION INTRAVENOUS at 12:07

## 2022-10-26 RX ADMIN — SODIUM CHLORIDE 25 ML/HR: 9 INJECTION, SOLUTION INTRAVENOUS at 12:06

## 2022-10-26 NOTE — PROGRESS NOTES
[facility]PATIENT [facility]    DISCHARGE INSTRUCTIONS FOR:    IRON INFUSIONS - INCLUDING VENOFER, FERRLECIT, AND INFED    You should continue to take your usual home medications unless otherwise instructed by your physician. Drink plenty of fluids and eat your usual diet. All medications have the potential to cause side effects. Your physician can instruct you regarding any necessary treatment for side effects. Some possible side effects of iron infusions may include the following:     - Urinary changes;   - Mild muscle cramping;   - Mild nausea, stomach pain, diarrhea or constipation;   - Mild skin itching, mild pain at IV site. Signs/Symptoms of an allergic reaction may require immediate medical attention. These may include one or more of the following:       Skin redness, itching, swelling, blistering, weeping, crusting, rash or hives. Wheezing, chest tightness, cough, or shortness of breath;   Swelling of the face, eyelids, lips, tongue, or throat;  Severe headache, seizures or tremor;  Stuffy nose, runny nose, sneezing;   Red (bloodshot), itchy, swollen, or watery eyes;  Stomach  pain, nausea, vomiting, diarrhea or bloody diarrhea; Chest pain or tightness, increased heart rate, palpitations, changes in blood pressure which can cause dizziness, unusual feelings of weakness or fatigue;  Back ache or pain around waist;  Painful urination, increase or decrease in amount of urine, blood in urine. Contact your physicians office with any questions or concerns regarding your treatment.     Sumaya Castro, Signature: _____________________________________ 10/26/2022  Neil Rene RN

## 2022-10-26 NOTE — PROGRESS NOTES
[facility] Progress Note    Date: 2022    Name: Michael Bridges    MRN: [id number]         : 1965    Ms. Payne Arrived ambulatory and in no distress for Dose 1 of 2  for Injectafer. Assessment was completed, no acute issues at this time, no new complaints voiced. 24 G PIV established to right arm without difficulty. No labs drawn today. Ms. Vinita Quesada vitals were reviewed. Patient Vitals for the past 24 hrs:   Temp Pulse Resp BP SpO2   10/26/22 1306 98 °F (36.7 °C) 65 16 (!) 100/56 --   10/26/22 1227 97.7 °F (36.5 °C) 67 16 (!) 103/56 --   10/26/22 1143 98 °F (36.7 °C) 82 16 105/63 96 %              Medications:  Medications Administered       0.9% sodium chloride infusion       Admin Date  10/26/2022 Action  New Bag Dose  25 mL/hr Rate  25 mL/hr Route  IntraVENous Administered By  Lisandro Rosas RN              ferric carboxymaltose (INJECTAFER) 750 mg in 0.9% sodium chloride 250 mL, overfill volume 25 mL IVPB       Admin Date  10/26/2022 Action  New Bag Dose  750 mg Rate  870 mL/hr Route  IntraVENous Administered By  Lisandro Rosas RN                       Ms. Robertson Rather tolerated treatment well and was discharged from [place] [address] in stable condition . PIV flushed & removed. Discharge instructions reviewed with patient, verbalized understanding. Patient politely agreed to stay 30 minutes post infusion for monitoring. She is to return on  at 1130 for her next appointment.     Stacia Aase, RN  2022

## 2022-11-02 ENCOUNTER — HOSPITAL ENCOUNTER (OUTPATIENT)
Dept: INFUSION THERAPY | Age: 57
Discharge: HOME | End: 2022-11-02
Payer: COMMERCIAL

## 2022-11-02 VITALS
TEMPERATURE: 97.4 F | SYSTOLIC BLOOD PRESSURE: 111 MMHG | HEART RATE: 64 BPM | DIASTOLIC BLOOD PRESSURE: 67 MMHG | RESPIRATION RATE: 16 BRPM | OXYGEN SATURATION: 95 % | BODY MASS INDEX: 26.12 KG/M2 | HEIGHT: 66 IN | WEIGHT: 162.5 LBS

## 2022-11-02 PROCEDURE — 96360 HYDRATION IV INFUSION INIT: CPT

## 2022-11-02 PROCEDURE — 74011250636 HC RX REV CODE- 250/636: Performed by: NURSE PRACTITIONER

## 2022-11-02 RX ADMIN — FERRIC CARBOXYMALTOSE INJECTION 750 MG: 50 INJECTION, SOLUTION INTRAVENOUS at 12:04

## 2022-11-02 RX ADMIN — SODIUM CHLORIDE 25 ML/HR: 9 INJECTION, SOLUTION INTRAVENOUS at 12:03

## 2022-11-02 NOTE — PROGRESS NOTES
[facility] Progress Note    Date: 2022    Name: Lila Najera    MRN: [id number]         : 1965    Ms. Payne Arrived ambulatory and in no distress for Dose 2 of 2  for Injectafer. Assessment was completed, no acute issues at this time, no new complaints voiced. 24 G PIV established to right arm without difficulty. Ms. Kitty Castaneda vitals were reviewed. Visit Vitals  /73   Pulse 65   Temp 97.7 °F (36.5 °C)   Resp 16   Ht 5' 6\" (1.676 m)   Wt 73.7 kg (162 lb 8 oz)   SpO2 95%   BMI 26.23 kg/m²         Medications:  Medications Administered       0.9% sodium chloride infusion       Admin Date  2022 Action  New Bag Dose  25 mL/hr Rate  25 mL/hr Route  IntraVENous Administered By  [email or web address], [place]              ferric carboxymaltose (INJECTAFER) 750 mg in 0.9% sodium chloride 250 mL, overfill volume 25 mL IVPB       Admin Date  2022 Action  New Bag Dose  750 mg Rate  870 mL/hr Route  IntraVENous Administered By  [email or web address], [address] [place] [address] tolerated treatment well and was discharged from [place] [address] in stable condition . PIV flushed & removed. Discharge instructions reviewed with patient, verbalized understanding. Patient politely declined to stay 30 minutes post infusion for monitoring. She is to follow up as needed for her next appointment.     [facility]  2022

## 2022-12-22 ENCOUNTER — HOSPITAL ENCOUNTER (EMERGENCY)
Age: 57
Discharge: HOME | End: 2022-12-22
Attending: EMERGENCY MEDICINE
Payer: COMMERCIAL

## 2022-12-22 ENCOUNTER — APPOINTMENT (OUTPATIENT)
Dept: CT IMAGING | Age: 57
End: 2022-12-22
Attending: EMERGENCY MEDICINE
Payer: COMMERCIAL

## 2022-12-22 VITALS
BODY MASS INDEX: 26.68 KG/M2 | DIASTOLIC BLOOD PRESSURE: 83 MMHG | RESPIRATION RATE: 17 BRPM | OXYGEN SATURATION: 99 % | SYSTOLIC BLOOD PRESSURE: 137 MMHG | WEIGHT: 166 LBS | HEIGHT: 66 IN | TEMPERATURE: 97.8 F | HEART RATE: 73 BPM

## 2022-12-22 DIAGNOSIS — K62.89 PERIRECTAL INFLAMMATION: Primary | ICD-10-CM

## 2022-12-22 LAB
ABO + RH BLD: NORMAL
ALBUMIN SERPL-MCNC: 2.6 G/DL (ref 3.5–5)
ALBUMIN/GLOB SERPL: 0.6 {RATIO} (ref 1.1–2.2)
ALP SERPL-CCNC: 78 U/L (ref 45–117)
ALT SERPL-CCNC: 17 U/L (ref 12–78)
ANION GAP SERPL CALC-SCNC: 6 MMOL/L (ref 5–15)
APPEARANCE UR: CLEAR
AST SERPL-CCNC: 15 U/L (ref 15–37)
BACTERIA URNS QL MICRO: ABNORMAL /HPF
BASOPHILS # BLD: 0 K/UL (ref 0–0.1)
BASOPHILS NFR BLD: 1 % (ref 0–1)
BILIRUB SERPL-MCNC: 0.3 MG/DL (ref 0.2–1)
BILIRUB UR QL: NEGATIVE
BLOOD GROUP ANTIBODIES SERPL: NORMAL
BUN SERPL-MCNC: 8 MG/DL (ref 6–20)
BUN/CREAT SERPL: 13 (ref 12–20)
CALCIUM SERPL-MCNC: 9 MG/DL (ref 8.5–10.1)
CHLORIDE SERPL-SCNC: 105 MMOL/L (ref 97–108)
CO2 SERPL-SCNC: 28 MMOL/L (ref 21–32)
COLOR UR: ABNORMAL
COMMENT, HOLDF: NORMAL
CREAT BLD-MCNC: 0.7 MG/DL (ref 0.6–1.3)
CREAT SERPL-MCNC: 0.61 MG/DL (ref 0.55–1.02)
DIFFERENTIAL METHOD BLD: ABNORMAL
EOSINOPHIL # BLD: 0.3 K/UL (ref 0–0.4)
EOSINOPHIL NFR BLD: 5 % (ref 0–7)
EPITH CASTS URNS QL MICRO: ABNORMAL /LPF
ERYTHROCYTE [DISTWIDTH] IN BLOOD BY AUTOMATED COUNT: 17.9 % (ref 11.5–14.5)
GLOBULIN SER CALC-MCNC: 4.3 G/DL (ref 2–4)
GLUCOSE SERPL-MCNC: 114 MG/DL (ref 65–100)
GLUCOSE UR STRIP.AUTO-MCNC: NEGATIVE MG/DL
HCT VFR BLD AUTO: 30.8 % (ref 35–47)
HGB BLD-MCNC: 9.5 G/DL (ref 11.5–16)
HGB UR QL STRIP: ABNORMAL
HYALINE CASTS URNS QL MICRO: ABNORMAL /LPF (ref 0–2)
IMM GRANULOCYTES # BLD AUTO: 0 K/UL (ref 0–0.04)
IMM GRANULOCYTES NFR BLD AUTO: 0 % (ref 0–0.5)
KETONES UR QL STRIP.AUTO: NEGATIVE MG/DL
LACTATE BLD-SCNC: 0.56 MMOL/L (ref 0.4–2)
LEUKOCYTE ESTERASE UR QL STRIP.AUTO: ABNORMAL
LYMPHOCYTES # BLD: 0.8 K/UL (ref 0.8–3.5)
LYMPHOCYTES NFR BLD: 12 % (ref 12–49)
MCH RBC QN AUTO: 26.2 PG (ref 26–34)
MCHC RBC AUTO-ENTMCNC: 30.8 G/DL (ref 30–36.5)
MCV RBC AUTO: 84.8 FL (ref 80–99)
MONOCYTES # BLD: 0.4 K/UL (ref 0–1)
MONOCYTES NFR BLD: 6 % (ref 5–13)
NEUTS SEG # BLD: 5.1 K/UL (ref 1.8–8)
NEUTS SEG NFR BLD: 76 % (ref 32–75)
NITRITE UR QL STRIP.AUTO: NEGATIVE
NRBC # BLD: 0 K/UL (ref 0–0.01)
NRBC BLD-RTO: 0 PER 100 WBC
PH UR STRIP: 5 [PH] (ref 5–8)
PLATELET # BLD AUTO: 289 K/UL (ref 150–400)
PMV BLD AUTO: 8.4 FL (ref 8.9–12.9)
POTASSIUM SERPL-SCNC: 3.6 MMOL/L (ref 3.5–5.1)
PROT SERPL-MCNC: 6.9 G/DL (ref 6.4–8.2)
PROT UR STRIP-MCNC: NEGATIVE MG/DL
RBC # BLD AUTO: 3.63 M/UL (ref 3.8–5.2)
RBC #/AREA URNS HPF: ABNORMAL /HPF (ref 0–5)
SAMPLES BEING HELD,HOLD: NORMAL
SODIUM SERPL-SCNC: 139 MMOL/L (ref 136–145)
SP GR UR REFRACTOMETRY: >1.03 (ref 1–1.03)
SPECIMEN EXP DATE BLD: NORMAL
UR CULT HOLD, URHOLD: NORMAL
UROBILINOGEN UR QL STRIP.AUTO: 0.2 EU/DL (ref 0.2–1)
WBC # BLD AUTO: 6.8 K/UL (ref 3.6–11)
WBC URNS QL MICRO: ABNORMAL /HPF (ref 0–4)

## 2022-12-22 PROCEDURE — 82565 ASSAY OF CREATININE: CPT

## 2022-12-22 PROCEDURE — 86900 BLOOD TYPING SEROLOGIC ABO: CPT

## 2022-12-22 PROCEDURE — 83605 ASSAY OF LACTIC ACID: CPT

## 2022-12-22 PROCEDURE — 74011000636 HC RX REV CODE- 636: Performed by: RADIOLOGY

## 2022-12-22 PROCEDURE — 36415 COLL VENOUS BLD VENIPUNCTURE: CPT

## 2022-12-22 PROCEDURE — 74177 CT ABD & PELVIS W/CONTRAST: CPT

## 2022-12-22 PROCEDURE — 80053 COMPREHEN METABOLIC PANEL: CPT

## 2022-12-22 PROCEDURE — 74011250636 HC RX REV CODE- 250/636: Performed by: EMERGENCY MEDICINE

## 2022-12-22 PROCEDURE — 85025 COMPLETE CBC W/AUTO DIFF WBC: CPT

## 2022-12-22 PROCEDURE — 99285 EMERGENCY DEPT VISIT HI MDM: CPT

## 2022-12-22 PROCEDURE — 81001 URINALYSIS AUTO W/SCOPE: CPT

## 2022-12-22 PROCEDURE — 87040 BLOOD CULTURE FOR BACTERIA: CPT

## 2022-12-22 RX ORDER — METRONIDAZOLE 500 MG/1
500 TABLET ORAL 3 TIMES DAILY
Qty: 42 TABLET | Refills: 0 | Status: SHIPPED | OUTPATIENT
Start: 2022-12-22 | End: 2023-01-05

## 2022-12-22 RX ORDER — SODIUM CHLORIDE 0.9 % (FLUSH) 0.9 %
5-40 SYRINGE (ML) INJECTION EVERY 8 HOURS
Status: DISCONTINUED | OUTPATIENT
Start: 2022-12-22 | End: 2022-12-22 | Stop reason: HOSPADM

## 2022-12-22 RX ORDER — SODIUM CHLORIDE 0.9 % (FLUSH) 0.9 %
5-40 SYRINGE (ML) INJECTION AS NEEDED
Status: DISCONTINUED | OUTPATIENT
Start: 2022-12-22 | End: 2022-12-22 | Stop reason: HOSPADM

## 2022-12-22 RX ORDER — CIPROFLOXACIN 500 MG/1
500 TABLET ORAL 2 TIMES DAILY
Qty: 28 TABLET | Refills: 0 | Status: SHIPPED | OUTPATIENT
Start: 2022-12-22 | End: 2023-01-05

## 2022-12-22 RX ADMIN — IOPAMIDOL 90 ML: 755 INJECTION, SOLUTION INTRAVENOUS at 16:13

## 2022-12-22 NOTE — ED TRIAGE NOTES
Pt reports abscess near rectum, reports fever at home yesterday of 101. 9.  hx of crohns. Denies abd pain. Report dark colored stool.

## 2022-12-22 NOTE — ED PROVIDER NOTES
Ms Jimenez To is a 57 yo woman with PMHx of crohns, constipation who presents to the ED with complaints of rectal mass for 2 days associated with mild pain, and 1 episode of fever of  101 F at home. Also has 4 days of blackish stools. She receives regular infusion of Stelara for her Crohn's disease and follows with Dr Marilee Miguel. Denies nausea, vomiting, abdominal pain, diarrhea. Abscess        Past Medical History:   Diagnosis Date    Anal fistula     Anemia     Crohn's disease ([address]     H/O prior ablation treatment     H/O tubal ligation     History of cholecystectomy     History of echocardiogram 12/19/2011    EF 60-65%. No significant valvular heart disease. Hypertension     Normal nuclear stress test 12/19/2011    No ischemia or prior infarction. EF 62%. Equivocal EKG on max EST. Ex time 6:31. PSVT (paroxysmal supraventricular tachycardia) (HCC)        Past Surgical History:   Procedure Laterality Date    HX ADENOIDECTOMY      HX CHOLECYSTECTOMY      HX GI  5/5/2011    Incision & Drainage of perirectal abscess w/ placement of seton    HX GI  2/17/2011    Incision & Drainage of perirectal abscess w/ placement of seton.     HX TUBAL LIGATION      AL CARDIAC SURG PROCEDURE UNLIST      cardiac ablation for svt         Family History:   Problem Relation Age of Onset    Breast Cancer Mother 54    Cancer Mother         breast    Hypertension Father     Pacemaker Father 80    Depression Daughter     Alcohol abuse Sister     Drug Abuse Sister        Social History     Socioeconomic History    Marital status: SINGLE     Spouse name: Not on file    Number of children: Not on file    Years of education: Not on file    Highest education level: Not on file   Occupational History    Not on file   Tobacco Use    Smoking status: Never    Smokeless tobacco: Never   Substance and Sexual Activity    Alcohol use: Yes     Comment: rarely    Drug use: No    Sexual activity: Not Currently   Other Topics Concern    Not on file   Social History Narrative    Not on file     Social Determinants of Health     Financial Resource Strain: Not on file   Food Insecurity: Not on file   Transportation Needs: Not on file   Physical Activity: Not on file   Stress: Not on file   Social Connections: Not on file   Intimate Partner Violence: Not on file   Housing Stability: Not on file         ALLERGIES: Lisinopril    Review of Systems   Constitutional:  Positive for fever. Negative for chills. HENT:  Negative for congestion. Eyes:  Negative for discharge. Respiratory:  Negative for shortness of breath. Cardiovascular:  Negative for chest pain and leg swelling. Gastrointestinal:  Negative for abdominal pain, nausea and vomiting. Genitourinary:  Negative for dysuria. Musculoskeletal:  Negative for arthralgias. Skin:  Negative for rash. Allergic/Immunologic: Negative for immunocompromised state. Neurological:  Negative for syncope and light-headedness. Hematological:  Does not bruise/bleed easily. Psychiatric/Behavioral:  Negative for sleep disturbance. Vitals:    12/22/22 1515   BP: 136/87   Pulse: 73   Resp: 16   Temp: 98.2 °F (36.8 °C)   SpO2: 97%   Weight: 75.3 kg (166 lb)   Height: 5' 6\" (1.676 m)            Physical Exam  Constitutional:       Appearance: Normal appearance. HENT:      Head: Normocephalic and atraumatic. Mouth/Throat:      Mouth: Mucous membranes are moist.   Eyes:      Extraocular Movements: Extraocular movements intact. Conjunctiva/sclera: Conjunctivae normal.   Cardiovascular:      Rate and Rhythm: Normal rate and regular rhythm. Heart sounds: No murmur heard. Pulmonary:      Effort: Pulmonary effort is normal.      Breath sounds: Normal breath sounds. No wheezing or rales. Abdominal:      General: Bowel sounds are normal. There is no distension. Palpations: Abdomen is soft. Tenderness: There is no abdominal tenderness.    Genitourinary:     Comments: Left perianal firm lesion noted 1 x 2 cm in size, non erythematous, non tender. Seton noted. Musculoskeletal:      Cervical back: Normal range of motion and neck supple. No rigidity or tenderness. Right lower leg: No edema. Left lower leg: No edema. Skin:     General: Skin is warm and dry. Neurological:      General: No focal deficit present. Mental Status: She is alert and oriented to person, place, and time. Psychiatric:         Mood and Affect: Mood normal.         Behavior: Behavior normal.        MDM  Number of Diagnoses or Management Options  Perirectal inflammation  Diagnosis management comments: Ms Shannan Elliott is here for rectal mass. H/o Crohns. Ddx rectal abscess vs fistula vs cellulitis. Ordered labs . Will reassess. CT A/P with Persistent wall thickening throughout the entire sigmoid colon and at the  hepatic flexure with associated inflammatory changes. Phlegmonous process in the  left perirectal fat is new compared to the prior exam. Increasing soft tissue  density in the medial left buttock . WBC 6.8, afebrile on exam .Will consult GI for antibiotic plan. Consulted GI ,spoke with Dr Jovita Giraldo MD appreciate recs. Asked to review with colorectal surgery with regards to need for drainage. Consulted colorectal surgery, spoke to Dr Jose Schmidt MD appreciate recs. Advised Ciprofloxacin and flagyl for 2 weeks. Follow up with colorectal surgery outpatient.         Amount and/or Complexity of Data Reviewed  Clinical lab tests: ordered and reviewed  Tests in the radiology section of CPT®: ordered and reviewed           Procedures

## 2022-12-23 NOTE — ED NOTES
Patient IV positional, unable to complete IVF, ok per provider to not start a new line and discontinued

## 2022-12-25 LAB
BACTERIA SPEC CULT: NORMAL
SERVICE CMNT-IMP: NORMAL

## 2023-03-13 ENCOUNTER — HOSPITAL ENCOUNTER (EMERGENCY)
Age: 58
Discharge: HOME | End: 2023-03-13
Attending: EMERGENCY MEDICINE
Payer: COMMERCIAL

## 2023-03-13 ENCOUNTER — APPOINTMENT (OUTPATIENT)
Dept: CT IMAGING | Age: 58
End: 2023-03-13
Attending: EMERGENCY MEDICINE
Payer: COMMERCIAL

## 2023-03-13 VITALS
BODY MASS INDEX: 26.68 KG/M2 | HEIGHT: 66 IN | RESPIRATION RATE: 18 BRPM | HEART RATE: 72 BPM | DIASTOLIC BLOOD PRESSURE: 76 MMHG | TEMPERATURE: 98 F | OXYGEN SATURATION: 98 % | WEIGHT: 166 LBS | SYSTOLIC BLOOD PRESSURE: 128 MMHG

## 2023-03-13 DIAGNOSIS — K50.918 CROHN'S DISEASE WITH OTHER COMPLICATION, UNSPECIFIED GASTROINTESTINAL TRACT LOCATION (HCC): ICD-10-CM

## 2023-03-13 DIAGNOSIS — K62.89 PERIRECTAL INFLAMMATION: Primary | ICD-10-CM

## 2023-03-13 LAB
ALBUMIN SERPL-MCNC: 2.8 G/DL (ref 3.5–5)
ALBUMIN/GLOB SERPL: 0.7 (ref 1.1–2.2)
ALP SERPL-CCNC: 94 U/L (ref 45–117)
ALT SERPL-CCNC: 13 U/L (ref 12–78)
ANION GAP SERPL CALC-SCNC: 3 MMOL/L (ref 5–15)
AST SERPL-CCNC: 9 U/L (ref 15–37)
BASOPHILS # BLD: 0.1 K/UL (ref 0–0.1)
BASOPHILS NFR BLD: 1 % (ref 0–1)
BILIRUB SERPL-MCNC: 0.3 MG/DL (ref 0.2–1)
BUN SERPL-MCNC: 13 MG/DL (ref 6–20)
BUN/CREAT SERPL: 20 (ref 12–20)
CALCIUM SERPL-MCNC: 9.3 MG/DL (ref 8.5–10.1)
CHLORIDE SERPL-SCNC: 106 MMOL/L (ref 97–108)
CO2 SERPL-SCNC: 29 MMOL/L (ref 21–32)
COMMENT, HOLDF: NORMAL
CREAT SERPL-MCNC: 0.66 MG/DL (ref 0.55–1.02)
CRP SERPL-MCNC: 0.85 MG/DL (ref 0–0.6)
DIFFERENTIAL METHOD BLD: ABNORMAL
EOSINOPHIL # BLD: 0.3 K/UL (ref 0–0.4)
EOSINOPHIL NFR BLD: 5 % (ref 0–7)
ERYTHROCYTE [DISTWIDTH] IN BLOOD BY AUTOMATED COUNT: 15 % (ref 11.5–14.5)
GLOBULIN SER CALC-MCNC: 4.3 G/DL (ref 2–4)
GLUCOSE SERPL-MCNC: 122 MG/DL (ref 65–100)
HCT VFR BLD AUTO: 34.3 % (ref 35–47)
HGB BLD-MCNC: 10.7 G/DL (ref 11.5–16)
IMM GRANULOCYTES # BLD AUTO: 0 K/UL (ref 0–0.04)
IMM GRANULOCYTES NFR BLD AUTO: 0 % (ref 0–0.5)
LYMPHOCYTES # BLD: 0.8 K/UL (ref 0.8–3.5)
LYMPHOCYTES NFR BLD: 16 % (ref 12–49)
MCH RBC QN AUTO: 27.1 PG (ref 26–34)
MCHC RBC AUTO-ENTMCNC: 31.2 G/DL (ref 30–36.5)
MCV RBC AUTO: 86.8 FL (ref 80–99)
MONOCYTES # BLD: 0.4 K/UL (ref 0–1)
MONOCYTES NFR BLD: 7 % (ref 5–13)
NEUTS SEG # BLD: 3.4 K/UL (ref 1.8–8)
NEUTS SEG NFR BLD: 71 % (ref 32–75)
NRBC # BLD: 0 K/UL (ref 0–0.01)
NRBC BLD-RTO: 0 PER 100 WBC
PLATELET # BLD AUTO: 297 K/UL (ref 150–400)
PMV BLD AUTO: 8.7 FL (ref 8.9–12.9)
POTASSIUM SERPL-SCNC: 3.8 MMOL/L (ref 3.5–5.1)
PROT SERPL-MCNC: 7.1 G/DL (ref 6.4–8.2)
RBC # BLD AUTO: 3.95 M/UL (ref 3.8–5.2)
RBC MORPH BLD: ABNORMAL
SAMPLES BEING HELD,HOLD: NORMAL
SODIUM SERPL-SCNC: 138 MMOL/L (ref 136–145)
WBC # BLD AUTO: 5 K/UL (ref 3.6–11)

## 2023-03-13 PROCEDURE — 80053 COMPREHEN METABOLIC PANEL: CPT

## 2023-03-13 PROCEDURE — 99285 EMERGENCY DEPT VISIT HI MDM: CPT

## 2023-03-13 PROCEDURE — 74011000250 HC RX REV CODE- 250: Performed by: EMERGENCY MEDICINE

## 2023-03-13 PROCEDURE — 85025 COMPLETE CBC W/AUTO DIFF WBC: CPT

## 2023-03-13 PROCEDURE — 74177 CT ABD & PELVIS W/CONTRAST: CPT

## 2023-03-13 PROCEDURE — 86140 C-REACTIVE PROTEIN: CPT

## 2023-03-13 PROCEDURE — 74011000636 HC RX REV CODE- 636: Performed by: EMERGENCY MEDICINE

## 2023-03-13 PROCEDURE — 36415 COLL VENOUS BLD VENIPUNCTURE: CPT

## 2023-03-13 RX ORDER — SODIUM CHLORIDE 0.9 % (FLUSH) 0.9 %
5-40 SYRINGE (ML) INJECTION AS NEEDED
Status: DISCONTINUED | OUTPATIENT
Start: 2023-03-13 | End: 2023-03-13 | Stop reason: HOSPADM

## 2023-03-13 RX ORDER — SODIUM CHLORIDE 0.9 % (FLUSH) 0.9 %
5-40 SYRINGE (ML) INJECTION EVERY 8 HOURS
Status: DISCONTINUED | OUTPATIENT
Start: 2023-03-13 | End: 2023-03-13 | Stop reason: HOSPADM

## 2023-03-13 RX ADMIN — IOPAMIDOL 100 ML: 755 INJECTION, SOLUTION INTRAVENOUS at 17:57

## 2023-03-13 RX ADMIN — SODIUM CHLORIDE, PRESERVATIVE FREE 10 ML: 5 INJECTION INTRAVENOUS at 18:29

## 2023-03-13 NOTE — ED NOTES
DC paperwork reviewed, Pt verbalized understanding, IV removed. Pt ambulatory at time of DC, no distress noted.

## 2023-03-13 NOTE — ED TRIAGE NOTES
Pt arrives to ED with complaints of perirectal abscess that she believes is infected. Pt had surgery on it in January. PT reports drainage and pain.

## 2023-03-13 NOTE — ED PROVIDER NOTES
History of Crohn's disease, anal fistula, anemia, hypertension. She presents with complaints of suspicions of having another perirectal abscess. She states that she had surgery for this about 2 months ago. She believes it recurred about a month ago but improved with doxycycline. More recently her symptoms have recurred again. She describes anal/rectal pain and purulent drainage. She has been followed by her PCP for the past 4 to 5 days. She is gotten Rocephin daily for the past 4 days as well as doxycycline. She reports low-grade fevers to 100 but states this is not unusual with her Crohn's. She has not had any significant abdominal pain. Her appetite has been fair. She feels mildly fatigued. She states that she has been on Stelara recently for her Crohn's. Past Medical History:   Diagnosis Date    Anal fistula     Anemia     Crohn's disease ([facility] [address]     H/O prior ablation treatment     H/O tubal ligation     History of cholecystectomy     History of echocardiogram 12/19/2011    EF 60-65%. No significant valvular heart disease. Hypertension     Normal nuclear stress test 12/19/2011    No ischemia or prior infarction. EF 62%. Equivocal EKG on max EST. Ex time 6:31. PSVT (paroxysmal supraventricular tachycardia) (HCC)        Past Surgical History:   Procedure Laterality Date    HX ADENOIDECTOMY      HX CHOLECYSTECTOMY      HX GI  5/5/2011    Incision & Drainage of perirectal abscess w/ placement of seton    HX GI  2/17/2011    Incision & Drainage of perirectal abscess w/ placement of seton.     HX TUBAL LIGATION      [facility] C[facility] PROCEDURE UNLIST      cardiac ablation for svt         Family History:   Problem Relation Age of Onset    Breast Cancer Mother 54    Cancer Mother         breast    Hypertension Father     Pacemaker Father 80    Depression Daughter     Alcohol abuse Sister     Drug Abuse Sister        Social History     Socioeconomic History    Marital status: SINGLE     Spouse name: Not on file    Number of children: Not on file    Years of education: Not on file    Highest education level: Not on file   Occupational History    Not on file   Tobacco Use    Smoking status: Never    Smokeless tobacco: Never   Substance and Sexual Activity    Alcohol use: Yes     Comment: rarely    Drug use: No    Sexual activity: Not Currently   Other Topics Concern    Not on file   Social History Narrative    Not on file     Social Determinants of Health     Financial Resource Strain: Not on file   Food Insecurity: Not on file   Transportation Needs: Not on file   Physical Activity: Not on file   Stress: Not on file   Social Connections: Not on file   Intimate Partner Violence: Not on file   Housing Stability: Not on file         ALLERGIES: Lisinopril    Review of Systems   All other systems reviewed and are negative. Vitals:    03/13/23 1543   BP: 138/88   Pulse: 70   Resp: 18   Temp: 98.6 °F (37 °C)   SpO2: 100%   Weight: 75.3 kg (166 lb)   Height: 5' 6\" (1.676 m)            Physical Exam  Vitals and nursing note reviewed. Constitutional:       Appearance: She is well-developed. HENT:      Head: Normocephalic and atraumatic. Eyes:      Conjunctiva/sclera: Conjunctivae normal.   Neck:      Trachea: No tracheal deviation. Cardiovascular:      Rate and Rhythm: Normal rate and regular rhythm. Heart sounds: Normal heart sounds. No murmur heard. No friction rub. No gallop. Pulmonary:      Effort: Pulmonary effort is normal.      Breath sounds: Normal breath sounds. Abdominal:      Palpations: Abdomen is soft. Comments: Mild left lower quadrant tenderness   Musculoskeletal:         General: No deformity. Cervical back: Neck supple. Skin:     General: Skin is warm and dry. Neurological:      Mental Status: She is alert. Comments: oriented        Medical Decision Making  Amount and/or Complexity of Data Reviewed  Labs: ordered. Radiology: ordered.     Risk  Prescription drug management. Procedures    Progress Note:  Results, treatment, and follow up plan have been discussed with patient. Questions were answered. Barbara Lanier MD    Assessment/plan: 82-year-old with underlying Crohn's disease. She has a history of anal fistula. She presents with complaints of anal/perirectal pain with purulent drainage. She has recently gotten 4 doses of Rocephin (4 doses over a 4-day period) and has been on doxycycline. She was concerned about an abscess. Reassuring appearance/exam with stable vital signs. CBC, CMP okay. CRP mildly elevated. CT shows inflammation but no abscess. Home with continued doxycycline and colorectal follow-up. Return precautions.   Barbara Lanier MD  7:23 PM

## 2024-12-03 NOTE — PROGRESS NOTES
"Nurse was administering doxorubicin. The first syringe of 27.5 ml was completed patient stated his was ears was feeling very hot (temp of 99.0). He became diaphoretic oxygen saturation dropped.   Oxygen applied and solu-cortel 100 mg given. Patient began improving to baseline just reports having a lot of anxiety. Patient also stated he has "hot flashes" similar to this at home but this is the worse it has ever been.     Dr. Peralta notified via staff message. Instructed to proceed with treatment. Informed patient Dr. Peralta wants to proceed with treatment. All questions answered per satisfaction. Patient was able to complete second syringe of doxorubicin (27.5 ml) with no reactions.     See Vitals flowsheet and MAR.   " 9/23/2022  10:59 AM  CM completed assessment w/pt in person, charted demographics verified, pt's son lives w/ her in a 2 story home, there are 4 entry steps and 13 interior steps to her bed/bath. At baseline pt is ambulatory  independent w/ all ADLS, drives, Dtr is here to assist her at home. Reason for Admission: Emergent for Anemia  Pt is a 55 yo female who [facility] to  [facility] from PCP for low Hgb  PMHx; Anal fistula       Anemia      Crohn's disease ([address]      H/O prior ablation treatment      H/O tubal ligation      History of cholecystectomy      History of echocardiogram 12/19/2011     EF 60-65%. No significant valvular heart disease. Hypertension      Normal nuclear stress test 12/19/2011     No ischemia or prior infarction. EF 62%. Equivocal EKG on max EST. Ex time 6:31. PSVT (paroxysmal supraventricular tachycardia)                       RUR Score:     N/A pt is OBS Low Risk of Readmission/Green              Plan for utilizing home health:  [facility], pt is independent at baseline for her ADLS,      DME: None  Rx; [employer], pt uses [employer] and is usually covered for her medications  COVID Vax Hx;  Ivette Hollis, pt had 1 jabs and 1 booster in 2021  PCP: First and Last name:  None     Name of Practice: [facility]    Are you a current patient: Yes/No: yes    Approximate date of last visit: 9/22/22   Can you participate in a virtual visit with your PCP: yes                     Current Advanced Directive/Advance Care Plan: Full Code  [facility] Amparo Mckeon [phone]:   Click here to complete [address] including selection of the Healthcare Decision Maker Relationship (ie \"Primary\")                             Transition of Care Plan:                    RUR N/A pt is OBS, Low Risk of Readmission/Green  LOS 1 Day   Hospital admission for medical management  GI consult  CM to follow through  for treatment/response  DC when stable to home  Outpatient follow up PCP, GI  Family will transport at [address] Management Interventions  PCP Verified by CM:  Yes ([facility])  Palliative Care Criteria Met (RRAT>21 & CHF Dx)?: No  Mode of Transport at Discharge: Self (Family)  Physical Therapy Consult: No  Occupational Therapy Consult: No  Support Systems: Child(sean) (pt's son lives w/ her in pvt residence, at baseline pt is ambulatory,iADLS, drives )  Confirm Follow Up Transport: Self  Discharge Location  Patient Expects to be Discharged to[de-identified] Home with outpatient services  KAVON Deshpande